# Patient Record
Sex: FEMALE | Race: BLACK OR AFRICAN AMERICAN | NOT HISPANIC OR LATINO | Employment: OTHER | URBAN - METROPOLITAN AREA
[De-identification: names, ages, dates, MRNs, and addresses within clinical notes are randomized per-mention and may not be internally consistent; named-entity substitution may affect disease eponyms.]

---

## 2023-02-08 PROBLEM — L21.9 SEBORRHEIC DERMATITIS: Status: ACTIVE | Noted: 2023-02-08

## 2023-02-08 PROBLEM — J32.9 CHRONIC SINUSITIS: Status: ACTIVE | Noted: 2023-02-08

## 2023-02-08 PROBLEM — R06.09 DOE (DYSPNEA ON EXERTION): Status: ACTIVE | Noted: 2023-02-08

## 2023-02-08 PROBLEM — R00.0 SINUS TACHYCARDIA: Status: ACTIVE | Noted: 2023-02-08

## 2023-02-08 PROBLEM — R60.0 PEDAL EDEMA: Status: ACTIVE | Noted: 2023-02-08

## 2023-02-08 PROBLEM — R73.03 PREDIABETES: Status: ACTIVE | Noted: 2023-02-08

## 2023-02-08 PROBLEM — F29 PSYCHOTIC DISORDER (MULTI): Status: ACTIVE | Noted: 2023-02-08

## 2023-02-08 PROBLEM — F20.9 SCHIZOPHRENIA (MULTI): Status: ACTIVE | Noted: 2023-02-08

## 2023-02-08 PROBLEM — D50.9 IRON DEFICIENCY ANEMIA: Status: ACTIVE | Noted: 2023-02-08

## 2023-02-08 PROBLEM — F25.9 SCHIZOAFFECTIVE DISORDER, IN REMISSION (MULTI): Status: ACTIVE | Noted: 2023-02-08

## 2023-02-08 PROBLEM — E55.9 VITAMIN D DEFICIENCY DISEASE: Status: ACTIVE | Noted: 2023-02-08

## 2023-02-08 PROBLEM — J01.80 OTHER ACUTE SINUSITIS: Status: ACTIVE | Noted: 2023-02-08

## 2023-02-08 PROBLEM — R06.02 SHORTNESS OF BREATH ON EXERTION: Status: ACTIVE | Noted: 2023-02-08

## 2023-02-08 PROBLEM — R77.9 ELEVATED SERUM PROTEIN LEVEL: Status: ACTIVE | Noted: 2023-02-08

## 2023-02-08 PROBLEM — R73.09 ELEVATED GLUCOSE LEVEL: Status: ACTIVE | Noted: 2023-02-08

## 2023-02-08 PROBLEM — M85.80 OSTEOPENIA: Status: ACTIVE | Noted: 2023-02-08

## 2023-02-08 PROBLEM — N95.1 HOT FLASHES, MENOPAUSAL: Status: ACTIVE | Noted: 2023-02-08

## 2023-02-08 PROBLEM — M19.90 OSTEOARTHRITIS (ARTHRITIS DUE TO WEAR AND TEAR OF JOINTS): Status: ACTIVE | Noted: 2023-02-08

## 2023-02-08 PROBLEM — I10 BENIGN ESSENTIAL HYPERTENSION: Status: ACTIVE | Noted: 2023-02-08

## 2023-02-08 PROBLEM — R93.1 ABNORMAL HEART SCORE CT: Status: ACTIVE | Noted: 2023-02-08

## 2023-02-08 PROBLEM — E78.00 ELEVATED LDL CHOLESTEROL LEVEL: Status: ACTIVE | Noted: 2023-02-08

## 2023-02-08 PROBLEM — L23.9 ALLERGIC DERMATITIS: Status: ACTIVE | Noted: 2023-02-08

## 2023-02-08 PROBLEM — D12.6 TUBULAR ADENOMA OF COLON: Status: ACTIVE | Noted: 2023-02-08

## 2023-02-08 PROBLEM — R73.9 HYPERGLYCEMIA: Status: ACTIVE | Noted: 2023-02-08

## 2023-02-08 PROBLEM — M25.569 KNEE PAIN: Status: ACTIVE | Noted: 2023-02-08

## 2023-02-08 PROBLEM — L98.9 SKIN LESION: Status: ACTIVE | Noted: 2023-02-08

## 2023-02-08 PROBLEM — L50.8 URTICARIA, ACUTE: Status: ACTIVE | Noted: 2023-02-08

## 2023-02-08 PROBLEM — R07.89 ATYPICAL CHEST PAIN: Status: ACTIVE | Noted: 2023-02-08

## 2023-02-08 PROBLEM — R44.3 HALLUCINATIONS: Status: ACTIVE | Noted: 2023-02-08

## 2023-02-08 RX ORDER — MULTIVIT-MIN/FA/LYCOPEN/LUTEIN .4-300-25
TABLET ORAL
COMMUNITY

## 2023-02-08 RX ORDER — AMLODIPINE BESYLATE 10 MG/1
10 TABLET ORAL DAILY
COMMUNITY
Start: 2018-08-13 | End: 2023-03-14 | Stop reason: SDUPTHER

## 2023-02-08 RX ORDER — CETIRIZINE HYDROCHLORIDE 10 MG/1
10 TABLET ORAL DAILY
COMMUNITY
Start: 2020-02-13

## 2023-02-08 RX ORDER — CELECOXIB 200 MG/1
200 CAPSULE ORAL 2 TIMES DAILY
COMMUNITY
Start: 2020-10-02 | End: 2023-03-14 | Stop reason: SDUPTHER

## 2023-02-08 RX ORDER — KETOCONAZOLE 20 MG/G
CREAM TOPICAL DAILY
COMMUNITY

## 2023-02-08 RX ORDER — ASPIRIN 81 MG/1
81 TABLET ORAL DAILY
COMMUNITY
Start: 2022-04-25

## 2023-02-08 RX ORDER — ATORVASTATIN CALCIUM 20 MG/1
20 TABLET, FILM COATED ORAL DAILY
COMMUNITY
Start: 2022-04-25 | End: 2023-03-14 | Stop reason: SDUPTHER

## 2023-02-08 RX ORDER — VIT C/E/ZN/COPPR/LUTEIN/ZEAXAN 250MG-90MG
25 CAPSULE ORAL
COMMUNITY
Start: 2018-04-04

## 2023-02-08 RX ORDER — METOPROLOL SUCCINATE 25 MG/1
25 TABLET, EXTENDED RELEASE ORAL DAILY
COMMUNITY
Start: 2022-02-10 | End: 2023-03-14 | Stop reason: SDUPTHER

## 2023-02-08 RX ORDER — BETAMETHASONE DIPROPIONATE 0.5 MG/G
CREAM TOPICAL 2 TIMES DAILY
COMMUNITY
Start: 2020-02-13

## 2023-02-08 RX ORDER — GLUCOSAMINE/CHONDRO SU A 500-400 MG
1 TABLET ORAL
COMMUNITY

## 2023-03-14 ENCOUNTER — LAB (OUTPATIENT)
Dept: LAB | Facility: LAB | Age: 78
End: 2023-03-14
Payer: MEDICARE

## 2023-03-14 ENCOUNTER — OFFICE VISIT (OUTPATIENT)
Dept: PRIMARY CARE | Facility: CLINIC | Age: 78
End: 2023-03-14
Payer: MEDICARE

## 2023-03-14 VITALS
HEART RATE: 80 BPM | RESPIRATION RATE: 16 BRPM | WEIGHT: 164.2 LBS | BODY MASS INDEX: 32.07 KG/M2 | TEMPERATURE: 97.8 F | SYSTOLIC BLOOD PRESSURE: 130 MMHG | DIASTOLIC BLOOD PRESSURE: 70 MMHG

## 2023-03-14 DIAGNOSIS — R60.0 PEDAL EDEMA: ICD-10-CM

## 2023-03-14 DIAGNOSIS — F25.9 SCHIZOAFFECTIVE DISORDER, IN REMISSION (MULTI): ICD-10-CM

## 2023-03-14 DIAGNOSIS — Z12.31 SCREENING MAMMOGRAM, ENCOUNTER FOR: ICD-10-CM

## 2023-03-14 DIAGNOSIS — I10 BENIGN ESSENTIAL HYPERTENSION: Primary | ICD-10-CM

## 2023-03-14 DIAGNOSIS — J44.9 CHRONIC OBSTRUCTIVE PULMONARY DISEASE, UNSPECIFIED COPD TYPE (MULTI): ICD-10-CM

## 2023-03-14 DIAGNOSIS — D12.6 TUBULAR ADENOMA OF COLON: ICD-10-CM

## 2023-03-14 DIAGNOSIS — M19.91 PRIMARY OSTEOARTHRITIS, UNSPECIFIED SITE: ICD-10-CM

## 2023-03-14 DIAGNOSIS — E78.00 ELEVATED LDL CHOLESTEROL LEVEL: ICD-10-CM

## 2023-03-14 DIAGNOSIS — I10 BENIGN ESSENTIAL HYPERTENSION: ICD-10-CM

## 2023-03-14 DIAGNOSIS — R73.03 PREDIABETES: ICD-10-CM

## 2023-03-14 PROBLEM — F20.9 SCHIZOPHRENIA (MULTI): Status: RESOLVED | Noted: 2023-02-08 | Resolved: 2023-03-14

## 2023-03-14 PROBLEM — F29 PSYCHOTIC DISORDER (MULTI): Status: RESOLVED | Noted: 2023-02-08 | Resolved: 2023-03-14

## 2023-03-14 LAB
ALANINE AMINOTRANSFERASE (SGPT) (U/L) IN SER/PLAS: 26 U/L (ref 7–45)
ALBUMIN (G/DL) IN SER/PLAS: 4.4 G/DL (ref 3.4–5)
ALKALINE PHOSPHATASE (U/L) IN SER/PLAS: 42 U/L (ref 33–136)
ANION GAP IN SER/PLAS: 15 MMOL/L (ref 10–20)
ASPARTATE AMINOTRANSFERASE (SGOT) (U/L) IN SER/PLAS: 25 U/L (ref 9–39)
BILIRUBIN TOTAL (MG/DL) IN SER/PLAS: 0.7 MG/DL (ref 0–1.2)
CALCIUM (MG/DL) IN SER/PLAS: 9.2 MG/DL (ref 8.6–10.3)
CARBON DIOXIDE, TOTAL (MMOL/L) IN SER/PLAS: 27 MMOL/L (ref 21–32)
CHLORIDE (MMOL/L) IN SER/PLAS: 102 MMOL/L (ref 98–107)
CREATININE (MG/DL) IN SER/PLAS: 0.73 MG/DL (ref 0.5–1.05)
ERYTHROCYTE DISTRIBUTION WIDTH (RATIO) BY AUTOMATED COUNT: 17.8 % (ref 11.5–14.5)
ERYTHROCYTE MEAN CORPUSCULAR HEMOGLOBIN CONCENTRATION (G/DL) BY AUTOMATED: 29.7 G/DL (ref 32–36)
ERYTHROCYTE MEAN CORPUSCULAR VOLUME (FL) BY AUTOMATED COUNT: 76 FL (ref 80–100)
ERYTHROCYTES (10*6/UL) IN BLOOD BY AUTOMATED COUNT: 6.02 X10E12/L (ref 4–5.2)
GFR FEMALE: 84 ML/MIN/1.73M2
GLUCOSE (MG/DL) IN SER/PLAS: 96 MG/DL (ref 74–99)
HEMATOCRIT (%) IN BLOOD BY AUTOMATED COUNT: 45.8 % (ref 36–46)
HEMOGLOBIN (G/DL) IN BLOOD: 13.6 G/DL (ref 12–16)
LEUKOCYTES (10*3/UL) IN BLOOD BY AUTOMATED COUNT: 7.5 X10E9/L (ref 4.4–11.3)
PLATELETS (10*3/UL) IN BLOOD AUTOMATED COUNT: 217 X10E9/L (ref 150–450)
POC HDL CHOLESTEROL: 65 MG/DL (ref 0–50)
POC LDL CHOLESTEROL: 57 MG/DL (ref 0–100)
POC NON-HDL CHOLESTEROL: 75 MG/DL (ref 0–130)
POC TOTAL CHOLESTEROL/HDL RATIO: 2.1 (ref 0–4.5)
POC TOTAL CHOLESTEROL: 139 MG/DL (ref 0–199)
POC TRIGLYCERIDES: 90 MG/DL (ref 0–150)
POTASSIUM (MMOL/L) IN SER/PLAS: 4.2 MMOL/L (ref 3.5–5.3)
PROTEIN TOTAL: 6.7 G/DL (ref 6.4–8.2)
SODIUM (MMOL/L) IN SER/PLAS: 140 MMOL/L (ref 136–145)
THYROTROPIN (MIU/L) IN SER/PLAS BY DETECTION LIMIT <= 0.05 MIU/L: 0.94 MIU/L (ref 0.44–3.98)
UREA NITROGEN (MG/DL) IN SER/PLAS: 14 MG/DL (ref 6–23)

## 2023-03-14 PROCEDURE — 99214 OFFICE O/P EST MOD 30 MIN: CPT | Performed by: INTERNAL MEDICINE

## 2023-03-14 PROCEDURE — 3075F SYST BP GE 130 - 139MM HG: CPT | Performed by: INTERNAL MEDICINE

## 2023-03-14 PROCEDURE — 36415 COLL VENOUS BLD VENIPUNCTURE: CPT

## 2023-03-14 PROCEDURE — 80061 LIPID PANEL: CPT | Performed by: INTERNAL MEDICINE

## 2023-03-14 PROCEDURE — 3078F DIAST BP <80 MM HG: CPT | Performed by: INTERNAL MEDICINE

## 2023-03-14 PROCEDURE — 84443 ASSAY THYROID STIM HORMONE: CPT

## 2023-03-14 PROCEDURE — 85027 COMPLETE CBC AUTOMATED: CPT

## 2023-03-14 PROCEDURE — 80053 COMPREHEN METABOLIC PANEL: CPT

## 2023-03-14 PROCEDURE — 1160F RVW MEDS BY RX/DR IN RCRD: CPT | Performed by: INTERNAL MEDICINE

## 2023-03-14 PROCEDURE — 1159F MED LIST DOCD IN RCRD: CPT | Performed by: INTERNAL MEDICINE

## 2023-03-14 RX ORDER — AMLODIPINE BESYLATE 10 MG/1
10 TABLET ORAL DAILY
Qty: 30 TABLET | Refills: 3 | Status: SHIPPED | OUTPATIENT
Start: 2023-03-14 | End: 2023-08-29 | Stop reason: SDUPTHER

## 2023-03-14 RX ORDER — ALBUTEROL SULFATE 90 UG/1
2 AEROSOL, METERED RESPIRATORY (INHALATION) EVERY 4 HOURS PRN
Qty: 8 G | Refills: 5 | Status: SHIPPED | OUTPATIENT
Start: 2023-03-14 | End: 2023-05-12 | Stop reason: SDUPTHER

## 2023-03-14 RX ORDER — ATORVASTATIN CALCIUM 20 MG/1
20 TABLET, FILM COATED ORAL DAILY
Qty: 30 TABLET | Refills: 3 | Status: SHIPPED | OUTPATIENT
Start: 2023-03-14 | End: 2023-08-09 | Stop reason: SDUPTHER

## 2023-03-14 RX ORDER — CELECOXIB 200 MG/1
200 CAPSULE ORAL 2 TIMES DAILY
Qty: 60 CAPSULE | Refills: 3 | Status: SHIPPED | OUTPATIENT
Start: 2023-03-14 | End: 2023-05-13

## 2023-03-14 RX ORDER — METOPROLOL SUCCINATE 25 MG/1
25 TABLET, EXTENDED RELEASE ORAL DAILY
Qty: 30 TABLET | Refills: 3 | Status: SHIPPED | OUTPATIENT
Start: 2023-03-14 | End: 2023-09-18 | Stop reason: SDUPTHER

## 2023-03-14 ASSESSMENT — ENCOUNTER SYMPTOMS
STRIDOR: 0
SLEEP DISTURBANCE: 0
ACTIVITY CHANGE: 0
EYE DISCHARGE: 0
CHEST TIGHTNESS: 0
FATIGUE: 0
POLYPHAGIA: 0
FREQUENCY: 0
MUSCULOSKELETAL NEGATIVE: 1
APNEA: 0
ABDOMINAL PAIN: 0
PSYCHIATRIC NEGATIVE: 1
NERVOUS/ANXIOUS: 0
DIARRHEA: 0
SPEECH DIFFICULTY: 0
MYALGIAS: 0
COLOR CHANGE: 0
SEIZURES: 0
TREMORS: 0
RECTAL PAIN: 0
NECK STIFFNESS: 0
NAUSEA: 0
POLYDIPSIA: 0
ALLERGIC/IMMUNOLOGIC NEGATIVE: 1
DIZZINESS: 0
WHEEZING: 0
ABDOMINAL DISTENTION: 0
SHORTNESS OF BREATH: 1
HYPERACTIVE: 0
HEADACHES: 0
CONFUSION: 0
CONSTITUTIONAL NEGATIVE: 1
PHOTOPHOBIA: 0
EYES NEGATIVE: 1
APPETITE CHANGE: 0
NEUROLOGICAL NEGATIVE: 1
VOICE CHANGE: 0
GASTROINTESTINAL NEGATIVE: 1
BRUISES/BLEEDS EASILY: 0
AGITATION: 0
ENDOCRINE NEGATIVE: 1
FLANK PAIN: 0
CONSTIPATION: 0
DYSPHORIC MOOD: 0
RHINORRHEA: 0
SORE THROAT: 0
DYSURIA: 0
ANAL BLEEDING: 0
UNEXPECTED WEIGHT CHANGE: 0
PALPITATIONS: 0
SINUS PRESSURE: 0
NECK PAIN: 0
HEMATOLOGIC/LYMPHATIC NEGATIVE: 1
HEMATURIA: 0
COUGH: 0
LIGHT-HEADEDNESS: 0
TROUBLE SWALLOWING: 0
CARDIOVASCULAR NEGATIVE: 1
BACK PAIN: 0
WEAKNESS: 0
JOINT SWELLING: 0
DIFFICULTY URINATING: 0
NUMBNESS: 0
BLOOD IN STOOL: 0

## 2023-03-14 NOTE — PROGRESS NOTES
Nani Bello is a 78 y.o. female     Patient with a past medical history of HTN, HLD, COPD, Elevated CAC with a normal stress test (2022)Hyperglycemia/ Pre Diabetes, Schizoaffective disorder, DJD, Osteopenia (Oct 2023), Mammogram (Feb), Colonoscopy due in 2023     Feels fine  No chest pain/  SOB/ dizziness  BM OK  Energy level ok  Appetite OK    Albuterol is helping  Uses it once a day                No chief complaint on file.       Past Medical History:   Diagnosis Date    Personal history of other mental and behavioral disorders     History of psychiatric hospitalization        Review of Systems   Constitutional: Negative.  Negative for activity change, appetite change, fatigue and unexpected weight change.   HENT: Negative.  Negative for congestion, dental problem, ear discharge, ear pain, hearing loss, mouth sores, nosebleeds, postnasal drip, rhinorrhea, sinus pressure, sneezing, sore throat, tinnitus, trouble swallowing and voice change.    Eyes: Negative.  Negative for photophobia, discharge and visual disturbance.   Respiratory:  Positive for shortness of breath. Negative for apnea, cough, chest tightness, wheezing and stridor.    Cardiovascular: Negative.  Negative for chest pain, palpitations and leg swelling.   Gastrointestinal: Negative.  Negative for abdominal distention, abdominal pain, anal bleeding, blood in stool, constipation, diarrhea, nausea and rectal pain.   Endocrine: Negative.  Negative for cold intolerance, heat intolerance, polydipsia, polyphagia and polyuria.   Genitourinary: Negative.  Negative for decreased urine volume, difficulty urinating, dysuria, flank pain, frequency, hematuria and urgency.   Musculoskeletal: Negative.  Negative for back pain, gait problem, joint swelling, myalgias, neck pain and neck stiffness.   Skin: Negative.  Negative for color change and rash.   Allergic/Immunologic: Negative.  Negative for food allergies.   Neurological: Negative.  Negative for dizziness,  tremors, seizures, syncope, speech difficulty, weakness, light-headedness, numbness and headaches.   Hematological: Negative.  Does not bruise/bleed easily.   Psychiatric/Behavioral: Negative.  Negative for agitation, confusion, dysphoric mood and sleep disturbance. The patient is not nervous/anxious and is not hyperactive.             5/12/2022    11:25 AM 5/12/2022    11:45 AM 5/31/2022    10:49 AM 6/30/2022     9:41 AM 9/15/2022    12:08 PM 12/20/2022    11:53 AM 12/20/2022    12:05 PM   Vitals   Systolic  130 126 137 130  130   Diastolic  80 56 71 70  70   Heart Rate  80 91 90 78  70   Resp  16   16  16   Height (in) 1.524 m (5')  1.524 m (5') 1.524 m (5')  1.524 m (5')    Weight (lb) 161.13  161 155  161    BMI 31.47 kg/m2  31.44 kg/m2 30.27 kg/m2  31.44 kg/m2    BSA (m2) 1.76 m2  1.76 m2 1.73 m2  1.76 m2        Physical Exam  Vitals and nursing note reviewed.   Constitutional:       Appearance: Normal appearance. She is normal weight.   HENT:      Head: Normocephalic and atraumatic.      Right Ear: Tympanic membrane, ear canal and external ear normal.      Left Ear: Tympanic membrane, ear canal and external ear normal.      Nose: Nose normal. No congestion or rhinorrhea.      Mouth/Throat:      Mouth: Mucous membranes are dry.      Pharynx: Oropharynx is clear.   Eyes:      General: No scleral icterus.        Right eye: No discharge.         Left eye: No discharge.      Extraocular Movements: Extraocular movements intact.      Conjunctiva/sclera: Conjunctivae normal.      Pupils: Pupils are equal, round, and reactive to light.   Neck:      Vascular: No carotid bruit.   Cardiovascular:      Rate and Rhythm: Normal rate and regular rhythm.      Pulses: Normal pulses.      Heart sounds: Normal heart sounds. No murmur heard.     No friction rub. No gallop.   Pulmonary:      Effort: Pulmonary effort is normal. No respiratory distress.      Breath sounds: Normal breath sounds. No stridor. No wheezing, rhonchi or  rales.   Chest:      Chest wall: No tenderness.   Abdominal:      General: Abdomen is flat. Bowel sounds are normal. There is no distension.      Palpations: Abdomen is soft. There is no mass.      Tenderness: There is no abdominal tenderness. There is no right CVA tenderness, left CVA tenderness, guarding or rebound.      Hernia: No hernia is present.   Genitourinary:     Rectum: Normal. Guaiac result negative.   Musculoskeletal:         General: No swelling, tenderness, deformity or signs of injury.      Cervical back: Normal range of motion and neck supple. No rigidity or tenderness.      Right lower leg: No edema.      Left lower leg: No edema.   Lymphadenopathy:      Cervical: No cervical adenopathy.   Skin:     Coloration: Skin is not jaundiced or pale.      Findings: No bruising, erythema, lesion or rash.   Neurological:      General: No focal deficit present.      Mental Status: She is alert and oriented to person, place, and time. Mental status is at baseline.      Cranial Nerves: No cranial nerve deficit.      Sensory: No sensory deficit.      Motor: No weakness.      Coordination: Coordination normal.      Gait: Gait normal.      Deep Tendon Reflexes: Reflexes normal.   Psychiatric:         Mood and Affect: Mood normal.         Behavior: Behavior normal.         Thought Content: Thought content normal.         Judgment: Judgment normal.           No results found for requested labs within last 365 days.     Assessment/Plan          Patient with a past medical history of HTN, HLD, COPD, Elevated CAC with a normal stress test (2022)Hyperglycemia/ Pre Diabetes, Schizoaffective disorder, DJD, Osteopenia (Oct 2023), Mammogram (Feb), Colonoscopy due in 2023     # COPD  Continue Albuterol inhaler     # Prediabetes  stable     # HTN   better numbers     # HLD/ Elevated CAC  condition is stable  continue current medications     # Schizoaffective disorder  See Dr Ray, but not on any meds

## 2023-03-21 ENCOUNTER — APPOINTMENT (OUTPATIENT)
Dept: PRIMARY CARE | Facility: CLINIC | Age: 78
End: 2023-03-21
Payer: MEDICARE

## 2023-04-25 DIAGNOSIS — R92.8 ABNORMAL MAMMOGRAM: Primary | ICD-10-CM

## 2023-05-12 DIAGNOSIS — J44.9 CHRONIC OBSTRUCTIVE PULMONARY DISEASE, UNSPECIFIED COPD TYPE (MULTI): Primary | ICD-10-CM

## 2023-05-12 RX ORDER — FLUTICASONE PROPIONATE AND SALMETEROL 250; 50 UG/1; UG/1
1 POWDER RESPIRATORY (INHALATION)
Qty: 60 EACH | Refills: 11 | Status: SHIPPED | OUTPATIENT
Start: 2023-05-12 | End: 2024-04-05 | Stop reason: SDUPTHER

## 2023-05-12 RX ORDER — ALBUTEROL SULFATE 90 UG/1
2 AEROSOL, METERED RESPIRATORY (INHALATION) EVERY 4 HOURS PRN
Qty: 8 G | Refills: 5 | Status: SHIPPED | OUTPATIENT
Start: 2023-05-12 | End: 2024-05-11

## 2023-06-22 ENCOUNTER — OFFICE VISIT (OUTPATIENT)
Dept: PRIMARY CARE | Facility: CLINIC | Age: 78
End: 2023-06-22
Payer: MEDICARE

## 2023-06-22 VITALS
DIASTOLIC BLOOD PRESSURE: 77 MMHG | SYSTOLIC BLOOD PRESSURE: 145 MMHG | BODY MASS INDEX: 33.12 KG/M2 | HEART RATE: 78 BPM | TEMPERATURE: 98.4 F | WEIGHT: 169.6 LBS

## 2023-06-22 DIAGNOSIS — I10 BENIGN ESSENTIAL HYPERTENSION: ICD-10-CM

## 2023-06-22 DIAGNOSIS — E13.69 OTHER SPECIFIED DIABETES MELLITUS WITH OTHER SPECIFIED COMPLICATION, UNSPECIFIED WHETHER LONG TERM INSULIN USE (MULTI): ICD-10-CM

## 2023-06-22 DIAGNOSIS — R73.03 PREDIABETES: ICD-10-CM

## 2023-06-22 DIAGNOSIS — J43.9 PULMONARY EMPHYSEMA, UNSPECIFIED EMPHYSEMA TYPE (MULTI): Primary | ICD-10-CM

## 2023-06-22 DIAGNOSIS — E78.00 ELEVATED LDL CHOLESTEROL LEVEL: ICD-10-CM

## 2023-06-22 LAB
POC FINGERSTICK BLOOD GLUCOSE: 120 MG/DL (ref 70–100)
POC HDL CHOLESTEROL: 58 MG/DL (ref 0–50)
POC HEMOGLOBIN A1C: 6.1 % (ref 4.2–6.5)
POC LDL CHOLESTEROL: 65 MG/DL (ref 0–100)
POC NON-HDL CHOLESTEROL: 75 MG/DL (ref 0–130)
POC TOTAL CHOLESTEROL/HDL RATIO: 2.3 (ref 0–4.5)
POC TOTAL CHOLESTEROL: 134 MG/DL (ref 0–199)
POC TRIGLYCERIDES: 54 MG/DL (ref 0–150)

## 2023-06-22 PROCEDURE — 3078F DIAST BP <80 MM HG: CPT | Performed by: INTERNAL MEDICINE

## 2023-06-22 PROCEDURE — 1159F MED LIST DOCD IN RCRD: CPT | Performed by: INTERNAL MEDICINE

## 2023-06-22 PROCEDURE — 82962 GLUCOSE BLOOD TEST: CPT | Performed by: INTERNAL MEDICINE

## 2023-06-22 PROCEDURE — 99214 OFFICE O/P EST MOD 30 MIN: CPT | Performed by: INTERNAL MEDICINE

## 2023-06-22 PROCEDURE — 1160F RVW MEDS BY RX/DR IN RCRD: CPT | Performed by: INTERNAL MEDICINE

## 2023-06-22 PROCEDURE — 80061 LIPID PANEL: CPT | Performed by: INTERNAL MEDICINE

## 2023-06-22 PROCEDURE — 3077F SYST BP >= 140 MM HG: CPT | Performed by: INTERNAL MEDICINE

## 2023-06-22 PROCEDURE — 1036F TOBACCO NON-USER: CPT | Performed by: INTERNAL MEDICINE

## 2023-06-22 PROCEDURE — 83036 HEMOGLOBIN GLYCOSYLATED A1C: CPT | Performed by: INTERNAL MEDICINE

## 2023-06-22 RX ORDER — CELECOXIB 200 MG/1
200 CAPSULE ORAL
COMMUNITY
Start: 2023-06-13 | End: 2023-12-04 | Stop reason: SDUPTHER

## 2023-06-22 ASSESSMENT — ENCOUNTER SYMPTOMS
OCCASIONAL FEELINGS OF UNSTEADINESS: 0
DEPRESSION: 0
LOSS OF SENSATION IN FEET: 0

## 2023-06-22 NOTE — PROGRESS NOTES
Nani Bello is a 78 y.o. female   Patient with a past medical history of HTN, HLD, COPD, Elevated CAC with a normal stress test (2022)Hyperglycemia/ Pre Diabetes, Schizoaffective disorder, DJD, Osteopenia (Oct 2023), Mammogram (Feb), Colonoscopy due in 2023    Breathing is better  Wixela is working'  Using rescue inhalrers sparingly    Feels fine  No chest pain/  SOB/ dizziness  BM OK  Energy level ok  Appetite OK    Avg bp at home as 115/60         Review of Systems     Constitutional: no fever, no chills, not feeling poorly, not feeling tired and no recent weight gain, no recent weight loss.   ENT: no earache, no hearing loss, no nosebleeds, no nasal discharge, no sore throat and no hoarseness.   Cardiovascular: the heart rate was not slow, the heart rate was not fast, no chest pain, no palpitations, no intermittent leg claudication and no lower extremity edema.   Respiratory: no cough, wheezing or shortness of breath at rest or exertion  Gastrointestinal: no abdominal pain, no constipation, no melena, no nausea, no diarrhea, no vomiting and no blood in stools.   Musculoskeletal: no arthralgias, no myalgias, no back pain, no joint swelling, no joint stiffness, no limb pain and no limb swelling.   Integumentary: no skin rashes, no skin lesions, no itching, no skin wound and no dry skin.   Neurological: no headache, no confusion, no numbness, no dizziness, no tingling and no fainting.   All other systems have been reviewed and are negative for complaint.       Vitals:    06/22/23 1040   BP: 145/77   Pulse: 78   Temp: 36.9 °C (98.4 °F)        Physical Exam     Constitutional   General appearance: Alert and in no acute distress.     Pulmonary   Respiratory assessment: No respiratory distress, normal respiratory rhythm and effort.    Auscultation of Lungs: Clear bilateral breath sounds.   Cardiovascular   Auscultation of heart: Apical pulse normal, heart rate and rhythm normal, normal S1 and S2, no murmurs and no  pericardial rub.    Exam for edema: No peripheral edema.   Abdomen   Abdominal Exam: No bruits, normal bowel sounds, soft, non-tender, no abdominal mass palpated.    Liver and Spleen exam: No hepato-splenomegaly.   Musculoskeletal   Examination of gait: Normal.    Inspection of digits and nails: No clubbing or cyanosis of the fingernails.    Inspection/palpation of joints, bones and muscles: No joint swelling. Normal movement of all extremities.   Skin   Skin inspection: Normal skin color and pigmentation, normal skin turgor and no visible rash.   Neurologic   Cranial nerves: Nerves 2-12 were intact, no focal neuro defects.     Assessment/Plan          Patient with a past medical history of HTN, HLD, COPD, Elevated CAC with a normal stress test (2022)Hyperglycemia/ Pre Diabetes, Schizoaffective disorder, DJD, Osteopenia (Oct 2023), Mammogram (Feb), Colonoscopy due in 2023    # COPD  confirmed on PFT  start Albuterol inhaler    # Prediabetes  stable    # HTN   better numbers at home  Bring monitor    # HLD/ Elevated CAC  condition is stable  continue current medications

## 2023-08-09 DIAGNOSIS — E78.00 ELEVATED LDL CHOLESTEROL LEVEL: ICD-10-CM

## 2023-08-09 RX ORDER — ATORVASTATIN CALCIUM 20 MG/1
20 TABLET, FILM COATED ORAL DAILY
Qty: 90 TABLET | Refills: 0 | Status: SHIPPED | OUTPATIENT
Start: 2023-08-09 | End: 2023-11-09 | Stop reason: SDUPTHER

## 2023-08-29 DIAGNOSIS — I10 BENIGN ESSENTIAL HYPERTENSION: ICD-10-CM

## 2023-08-29 RX ORDER — AMLODIPINE BESYLATE 10 MG/1
10 TABLET ORAL DAILY
Qty: 30 TABLET | Refills: 3 | Status: SHIPPED | OUTPATIENT
Start: 2023-08-29 | End: 2024-02-07 | Stop reason: SDUPTHER

## 2023-09-18 DIAGNOSIS — I10 BENIGN ESSENTIAL HYPERTENSION: ICD-10-CM

## 2023-09-18 RX ORDER — METOPROLOL SUCCINATE 25 MG/1
25 TABLET, EXTENDED RELEASE ORAL DAILY
Qty: 30 TABLET | Refills: 3 | Status: SHIPPED | OUTPATIENT
Start: 2023-09-18 | End: 2024-01-19 | Stop reason: SDUPTHER

## 2023-09-20 ENCOUNTER — OFFICE VISIT (OUTPATIENT)
Dept: PRIMARY CARE | Facility: CLINIC | Age: 78
End: 2023-09-20
Payer: MEDICARE

## 2023-09-20 VITALS
SYSTOLIC BLOOD PRESSURE: 130 MMHG | TEMPERATURE: 98.4 F | BODY MASS INDEX: 33.79 KG/M2 | RESPIRATION RATE: 16 BRPM | DIASTOLIC BLOOD PRESSURE: 70 MMHG | WEIGHT: 173 LBS | HEART RATE: 66 BPM

## 2023-09-20 DIAGNOSIS — E78.00 ELEVATED LDL CHOLESTEROL LEVEL: ICD-10-CM

## 2023-09-20 DIAGNOSIS — I10 BENIGN ESSENTIAL HYPERTENSION: Primary | ICD-10-CM

## 2023-09-20 DIAGNOSIS — J43.9 PULMONARY EMPHYSEMA, UNSPECIFIED EMPHYSEMA TYPE (MULTI): ICD-10-CM

## 2023-09-20 DIAGNOSIS — Z91.89 PNEUMOCOCCAL VACCINATION INDICATED: ICD-10-CM

## 2023-09-20 LAB
POC HDL CHOLESTEROL: 60 MG/DL (ref 0–50)
POC LDL CHOLESTEROL: 70 MG/DL (ref 0–100)
POC NON-HDL CHOLESTEROL: 84 MG/DL (ref 0–130)
POC TOTAL CHOLESTEROL/HDL RATIO: 2.4 (ref 0–4.5)
POC TOTAL CHOLESTEROL: 144 MG/DL (ref 0–199)
POC TRIGLYCERIDES: 67 MG/DL (ref 0–150)

## 2023-09-20 PROCEDURE — 1036F TOBACCO NON-USER: CPT | Performed by: INTERNAL MEDICINE

## 2023-09-20 PROCEDURE — 3075F SYST BP GE 130 - 139MM HG: CPT | Performed by: INTERNAL MEDICINE

## 2023-09-20 PROCEDURE — 80061 LIPID PANEL: CPT | Performed by: INTERNAL MEDICINE

## 2023-09-20 PROCEDURE — 1160F RVW MEDS BY RX/DR IN RCRD: CPT | Performed by: INTERNAL MEDICINE

## 2023-09-20 PROCEDURE — 99214 OFFICE O/P EST MOD 30 MIN: CPT | Performed by: INTERNAL MEDICINE

## 2023-09-20 PROCEDURE — 3078F DIAST BP <80 MM HG: CPT | Performed by: INTERNAL MEDICINE

## 2023-09-20 PROCEDURE — 1159F MED LIST DOCD IN RCRD: CPT | Performed by: INTERNAL MEDICINE

## 2023-09-20 NOTE — PROGRESS NOTES
Nani Bello is a 78 y.o. female   Patient with a past medical history of HTN, HLD, COPD, Elevated CAC with a normal stress test (2022)Hyperglycemia/ Pre Diabetes, Schizoaffective disorder, DJD, Osteopenia (Oct 2023), Mammogram (Feb), Colonoscopy up to date      Feels fine  No chest pain/  SOB/ dizziness  BM OK  Energy level ok  Appetite OK  Breathing is stable, but has had a few flares      Avg bp at home as 115/60         Review of Systems     Constitutional: no fever, no chills, not feeling poorly, not feeling tired and no recent weight gain, no recent weight loss.   ENT: no earache, no hearing loss, no nosebleeds, no nasal discharge, no sore throat and no hoarseness.   Cardiovascular: the heart rate was not slow, the heart rate was not fast, no chest pain, no palpitations, no intermittent leg claudication and no lower extremity edema.   Respiratory: no cough, wheezing or shortness of breath at rest or exertion  Gastrointestinal: no abdominal pain, no constipation, no melena, no nausea, no diarrhea, no vomiting and no blood in stools.   Musculoskeletal: no arthralgias, no myalgias, no back pain, no joint swelling, no joint stiffness, no limb pain and no limb swelling.   Integumentary: no skin rashes, no skin lesions, no itching, no skin wound and no dry skin.   Neurological: no headache, no confusion, no numbness, no dizziness, no tingling and no fainting.   All other systems have been reviewed and are negative for complaint.       Vitals:    09/20/23 1451   Temp: 36.9 °C (98.4 °F)        Physical Exam     Constitutional   General appearance: Alert and in no acute distress.     Pulmonary   Respiratory assessment: No respiratory distress, normal respiratory rhythm and effort.    Auscultation of Lungs: Clear bilateral breath sounds.   Cardiovascular   Auscultation of heart: Apical pulse normal, heart rate and rhythm normal, normal S1 and S2, no murmurs and no pericardial rub.    Exam for edema: No peripheral  edema.   Abdomen   Abdominal Exam: No bruits, normal bowel sounds, soft, non-tender, no abdominal mass palpated.    Liver and Spleen exam: No hepato-splenomegaly.   Musculoskeletal   Examination of gait: Normal.    Inspection of digits and nails: No clubbing or cyanosis of the fingernails.    Inspection/palpation of joints, bones and muscles: No joint swelling. Normal movement of all extremities.   Skin   Skin inspection: Normal skin color and pigmentation, normal skin turgor and no visible rash.   Neurologic   Cranial nerves: Nerves 2-12 were intact, no focal neuro defects.     Assessment/Plan          Patient with a past medical history of HTN, HLD, COPD, Elevated CAC with a normal stress test (2022)Hyperglycemia/ Pre Diabetes, Schizoaffective disorder, DJD, Osteopenia (Oct 2023), Mammogram (Feb), Colonoscopy complete    # COPD  Stable  Continue Wixela    # Prediabetes  stable    # HTN   Stable  Continue current medications      # HLD/ Elevated CAC  Stable  Continue current medications  Watch salt, avoid excessive caffeine  Avoid processed meats/ sugars/juices Instead eat fresh fruit  Add walnuts and almonds to your diet  exercise 6 days a week for 30 minutes    Rx for PNEUMO VAC

## 2023-09-21 ENCOUNTER — APPOINTMENT (OUTPATIENT)
Dept: PRIMARY CARE | Facility: CLINIC | Age: 78
End: 2023-09-21
Payer: MEDICARE

## 2023-11-09 DIAGNOSIS — E78.00 ELEVATED LDL CHOLESTEROL LEVEL: ICD-10-CM

## 2023-11-09 RX ORDER — ATORVASTATIN CALCIUM 20 MG/1
20 TABLET, FILM COATED ORAL DAILY
Qty: 90 TABLET | Refills: 0 | Status: SHIPPED | OUTPATIENT
Start: 2023-11-09 | End: 2024-02-07 | Stop reason: SDUPTHER

## 2023-11-09 NOTE — TELEPHONE ENCOUNTER
Rx Refill Request Telephone Encounter    Name:  Nani Bello  :  [id number]  Specific Pharmacy location:  [facility]

## 2023-12-04 DIAGNOSIS — M19.91 PRIMARY OSTEOARTHRITIS, UNSPECIFIED SITE: ICD-10-CM

## 2023-12-04 RX ORDER — CELECOXIB 200 MG/1
200 CAPSULE ORAL DAILY
Qty: 90 CAPSULE | Refills: 0 | Status: SHIPPED | OUTPATIENT
Start: 2023-12-04 | End: 2024-02-28 | Stop reason: SDUPTHER

## 2023-12-27 ENCOUNTER — OFFICE VISIT (OUTPATIENT)
Dept: PRIMARY CARE | Facility: CLINIC | Age: 78
End: 2023-12-27
Payer: MEDICARE

## 2023-12-27 VITALS — BODY MASS INDEX: 33.75 KG/M2 | TEMPERATURE: 98.1 F | WEIGHT: 172.8 LBS

## 2023-12-27 DIAGNOSIS — R73.03 PREDIABETES: ICD-10-CM

## 2023-12-27 DIAGNOSIS — I10 BENIGN ESSENTIAL HYPERTENSION: Primary | ICD-10-CM

## 2023-12-27 DIAGNOSIS — Z71.89 COUNSELING REGARDING END OF LIFE DECISION MAKING: ICD-10-CM

## 2023-12-27 DIAGNOSIS — M85.88 OSTEOPENIA OF LUMBAR SPINE: ICD-10-CM

## 2023-12-27 DIAGNOSIS — Z12.31 SCREENING MAMMOGRAM, ENCOUNTER FOR: ICD-10-CM

## 2023-12-27 DIAGNOSIS — J43.9 PULMONARY EMPHYSEMA, UNSPECIFIED EMPHYSEMA TYPE (MULTI): ICD-10-CM

## 2023-12-27 DIAGNOSIS — E78.00 ELEVATED LDL CHOLESTEROL LEVEL: ICD-10-CM

## 2023-12-27 DIAGNOSIS — Z00.00 ROUTINE GENERAL MEDICAL EXAMINATION AT HEALTH CARE FACILITY: ICD-10-CM

## 2023-12-27 LAB
POC HDL CHOLESTEROL: 60 MG/DL (ref 0–50)
POC LDL CHOLESTEROL: 59 MG/DL (ref 0–100)
POC NON-HDL CHOLESTEROL: 75 MG/DL (ref 0–130)
POC TOTAL CHOLESTEROL/HDL RATIO: 2.3 (ref 0–4.5)
POC TOTAL CHOLESTEROL: 135 MG/DL (ref 0–199)
POC TRIGLYCERIDES: 82 MG/DL (ref 0–150)

## 2023-12-27 PROCEDURE — 99214 OFFICE O/P EST MOD 30 MIN: CPT | Performed by: INTERNAL MEDICINE

## 2023-12-27 PROCEDURE — 1036F TOBACCO NON-USER: CPT | Performed by: INTERNAL MEDICINE

## 2023-12-27 PROCEDURE — 1160F RVW MEDS BY RX/DR IN RCRD: CPT | Performed by: INTERNAL MEDICINE

## 2023-12-27 PROCEDURE — 80061 LIPID PANEL: CPT | Performed by: INTERNAL MEDICINE

## 2023-12-27 PROCEDURE — G0439 PPPS, SUBSEQ VISIT: HCPCS | Performed by: INTERNAL MEDICINE

## 2023-12-27 PROCEDURE — 1159F MED LIST DOCD IN RCRD: CPT | Performed by: INTERNAL MEDICINE

## 2023-12-27 ASSESSMENT — PATIENT HEALTH QUESTIONNAIRE - PHQ9
2. FEELING DOWN, DEPRESSED OR HOPELESS: NOT AT ALL
1. LITTLE INTEREST OR PLEASURE IN DOING THINGS: NOT AT ALL
SUM OF ALL RESPONSES TO PHQ9 QUESTIONS 1 AND 2: 0

## 2023-12-27 ASSESSMENT — ENCOUNTER SYMPTOMS
OCCASIONAL FEELINGS OF UNSTEADINESS: 0
DEPRESSION: 0
LOSS OF SENSATION IN FEET: 0

## 2023-12-27 NOTE — PROGRESS NOTES
Nani Bello is a 78 y.o. female here for a Medicare Wellness Exam.    No chief complaint on file.       Patient with a past medical history of HTN, HLD, COPD, Elevated CAC with a normal stress test (2022)Hyperglycemia/ Pre Diabetes, Schizoaffective disorder, DJD, Osteopenia (Oct 2023), Mammogram (Feb), Colonoscopy up to date    Feels fine apart from night sweats   Gets occasional cramp in right foot    No chest pain/  SOB/ dizziness  BM OK  Energy level ok   Appetite OK           Medicare Wellness Exam    The patent is being seen for a follow up annual wellness visit  Past Medical, Surgical and family History: Reviewed and updated in chart  Interval History: Patient has not been hospitalized previously  Medications and Supplements: Review of all medications by a prescribing practitioner or clinical pharmacist (such as prescriptions, OTC, Herbal therapies and supplements) documented in the medical record.    Patient Self-Assessment of health: Fair  Tobacco Use: No  Alcohol Use: No  Illicit drug use: No  Patient using opioids: No    Current Diet: well balanced  Adequate fluid intake: Yes  Caffeine intake: Yes  Exercise frequency: moderately active    Depression/Suicide screening: PHQ2/ PHQ9 (see screenings tab)    Hearing impairment: No  Uses hearing aids N/A  Cognitive impairment Observation: No   Patient or family reported cognitive impairment: No    Bathing: independent  Dressing: independent  Walking: independent  Taking Medications: independent  Feeding: independent  Personal Hygiene: independent  Managing Finances: independent  Shopping: independent  Housework/Basic Home Maintenance: independent  Handling transportation: independent  Preparing meals: independent    Bowels: continent  Bladder: continent    Falls Risk: has notfallen in last 6 months.   Their fall has not resulted in an injury.   Fall risk Factors: Fall Risk Factors:    none   Care Plan Risk: Care Plan: Low/Moderate Risk: Regular physical  activity such as walking, water aerobics or casper chi to improve strength, balance, coordination and flexibility. Wear appropriate, sensible shoe wear. Remove fall hazards at home such as loose rugs, obstacles, use non-slip surface in bath or shower. Keep living space well lit.      Home Safety Risk Factors: Home Safety Risk Factors: None  Advanced Directives:  Living will: Yes POA: Yes    Patient's End of Life Decisions: Provider agree to follow.      Past Medical History:   Diagnosis Date    Personal history of other mental and behavioral disorders     History of psychiatric hospitalization        Review of Systems     Constitutional: no fever, no chills, not feeling poorly, not feeling tired and no recent weight gain, no recent weight loss.   ENT: no earache, no hearing loss, no nosebleeds, no nasal discharge, no sore throat and no hoarseness.   Cardiovascular: the heart rate was not slow, the heart rate was not fast, no chest pain, no palpitations, no intermittent leg claudication and no lower extremity edema.   Respiratory: no cough, wheezing or shortness of breath at rest or exertion  Gastrointestinal: no abdominal pain, no constipation, no melena, no nausea, no diarrhea, no vomiting and no blood in stools.   Musculoskeletal: no arthralgias, no myalgias, no back pain, no joint swelling, no joint stiffness, no limb pain and no limb swelling.   Integumentary: no skin rashes, no skin lesions, no itching, no skin wound and no dry skin.   Neurological: no headache, no confusion, no numbness, no dizziness, no tingling and no fainting.   All other systems have been reviewed and are negative for complaint.          Physical Exam     Constitutional   General appearance: Alert and in no acute distress.     Pulmonary   Respiratory assessment: No respiratory distress, normal respiratory rhythm and effort.    Auscultation of Lungs: Clear bilateral breath sounds.   Cardiovascular   Auscultation of heart: Apical pulse normal,  heart rate and rhythm normal, normal S1 and S2, no murmurs and no pericardial rub.    Exam for edema: No peripheral edema.   Abdomen   Abdominal Exam: No bruits, normal bowel sounds, soft, non-tender, no abdominal mass palpated.    Liver and Spleen exam: No hepato-splenomegaly.   Musculoskeletal   Examination of gait: Normal.    Inspection of digits and nails: No clubbing or cyanosis of the fingernails.    Inspection/palpation of joints, bones and muscles: No joint swelling. Normal movement of all extremities.   Skin   Skin inspection: Normal skin color and pigmentation, normal skin turgor and no visible rash.   Neurologic   Cranial nerves: Nerves 2-12 were intact, no focal neuro defects.          Assessment/Plan          Patient with a past medical history of HTN, HLD, COPD, Elevated CAC with a normal stress test (2022)Hyperglycemia/ Pre Diabetes, Schizoaffective disorder, DJD, Osteopenia (Oct 2023), Mammogram (Feb), Colonoscopy up to date     #Hypertension  Stable continue home medications    #Dyslipidemia  Stable  Continue current medications  Watch salt, avoid excessive caffeine  Avoid processed meats/ sugars/juices Instead eat fresh fruit  Add walnuts and almonds to your diet  exercise 6 days a week for 30 minutes    #COPD  Does get dyspnea on exertion but overall stable    #Prediabetes  Continue exercise and healthy eating habits    #Osteopenia  Check bone density    #Occasional right foot cramps  Patient has flatfeet  Recommend stretching exercises    Counseled the patient getting pneumonia vaccine  Complete blood work  Mammogram  DEXA scan ordered

## 2023-12-28 ENCOUNTER — APPOINTMENT (OUTPATIENT)
Dept: PRIMARY CARE | Facility: CLINIC | Age: 78
End: 2023-12-28
Payer: MEDICARE

## 2024-01-19 DIAGNOSIS — I10 BENIGN ESSENTIAL HYPERTENSION: ICD-10-CM

## 2024-01-19 RX ORDER — METOPROLOL SUCCINATE 25 MG/1
25 TABLET, EXTENDED RELEASE ORAL DAILY
Qty: 30 TABLET | Refills: 3 | Status: SHIPPED | OUTPATIENT
Start: 2024-01-19 | End: 2024-05-24 | Stop reason: SDUPTHER

## 2024-02-07 DIAGNOSIS — E78.00 ELEVATED LDL CHOLESTEROL LEVEL: ICD-10-CM

## 2024-02-07 DIAGNOSIS — I10 BENIGN ESSENTIAL HYPERTENSION: ICD-10-CM

## 2024-02-07 RX ORDER — AMLODIPINE BESYLATE 10 MG/1
10 TABLET ORAL DAILY
Qty: 90 TABLET | Refills: 0 | Status: SHIPPED | OUTPATIENT
Start: 2024-02-07 | End: 2024-05-15 | Stop reason: SDUPTHER

## 2024-02-07 RX ORDER — ATORVASTATIN CALCIUM 20 MG/1
20 TABLET, FILM COATED ORAL DAILY
Qty: 90 TABLET | Refills: 0 | Status: SHIPPED | OUTPATIENT
Start: 2024-02-07 | End: 2024-04-07

## 2024-02-28 DIAGNOSIS — M19.91 PRIMARY OSTEOARTHRITIS, UNSPECIFIED SITE: ICD-10-CM

## 2024-02-28 RX ORDER — CELECOXIB 200 MG/1
200 CAPSULE ORAL DAILY
Qty: 90 CAPSULE | Refills: 1 | Status: SHIPPED | OUTPATIENT
Start: 2024-02-28

## 2024-03-13 ENCOUNTER — LAB (OUTPATIENT)
Dept: LAB | Facility: LAB | Age: 79
End: 2024-03-13
Payer: MEDICARE

## 2024-03-13 DIAGNOSIS — E78.00 ELEVATED LDL CHOLESTEROL LEVEL: ICD-10-CM

## 2024-03-13 DIAGNOSIS — I10 BENIGN ESSENTIAL HYPERTENSION: ICD-10-CM

## 2024-03-13 DIAGNOSIS — R73.03 PREDIABETES: ICD-10-CM

## 2024-03-13 LAB
ALBUMIN SERPL BCP-MCNC: 4.4 G/DL (ref 3.4–5)
ALP SERPL-CCNC: 58 U/L (ref 33–136)
ALT SERPL W P-5'-P-CCNC: 31 U/L (ref 7–45)
ANION GAP SERPL CALC-SCNC: 11 MMOL/L (ref 10–20)
AST SERPL W P-5'-P-CCNC: 30 U/L (ref 9–39)
BILIRUB SERPL-MCNC: 0.7 MG/DL (ref 0–1.2)
BUN SERPL-MCNC: 12 MG/DL (ref 6–23)
CALCIUM SERPL-MCNC: 9.5 MG/DL (ref 8.6–10.3)
CHLORIDE SERPL-SCNC: 101 MMOL/L (ref 98–107)
CO2 SERPL-SCNC: 31 MMOL/L (ref 21–32)
CREAT SERPL-MCNC: 0.83 MG/DL (ref 0.5–1.05)
EGFRCR SERPLBLD CKD-EPI 2021: 72 ML/MIN/1.73M*2
ERYTHROCYTE [DISTWIDTH] IN BLOOD BY AUTOMATED COUNT: 17.4 % (ref 11.5–14.5)
EST. AVERAGE GLUCOSE BLD GHB EST-MCNC: 134 MG/DL
GLUCOSE SERPL-MCNC: 94 MG/DL (ref 74–99)
HBA1C MFR BLD: 6.3 %
HCT VFR BLD AUTO: 44.4 % (ref 36–46)
HGB BLD-MCNC: 13.5 G/DL (ref 12–16)
MCH RBC QN AUTO: 22.4 PG (ref 26–34)
MCHC RBC AUTO-ENTMCNC: 30.4 G/DL (ref 32–36)
MCV RBC AUTO: 74 FL (ref 80–100)
NRBC BLD-RTO: 0 /100 WBCS (ref 0–0)
PLATELET # BLD AUTO: 234 X10*3/UL (ref 150–450)
POTASSIUM SERPL-SCNC: 4.3 MMOL/L (ref 3.5–5.3)
PROT SERPL-MCNC: 7.4 G/DL (ref 6.4–8.2)
RBC # BLD AUTO: 6.03 X10*6/UL (ref 4–5.2)
SODIUM SERPL-SCNC: 139 MMOL/L (ref 136–145)
TSH SERPL-ACNC: 0.81 MIU/L (ref 0.44–3.98)
WBC # BLD AUTO: 7.6 X10*3/UL (ref 4.4–11.3)

## 2024-03-13 PROCEDURE — 80053 COMPREHEN METABOLIC PANEL: CPT

## 2024-03-13 PROCEDURE — 36415 COLL VENOUS BLD VENIPUNCTURE: CPT

## 2024-03-13 PROCEDURE — 84443 ASSAY THYROID STIM HORMONE: CPT

## 2024-03-13 PROCEDURE — 83036 HEMOGLOBIN GLYCOSYLATED A1C: CPT

## 2024-03-13 PROCEDURE — 85027 COMPLETE CBC AUTOMATED: CPT

## 2024-03-27 ENCOUNTER — HOSPITAL ENCOUNTER (OUTPATIENT)
Dept: RADIOLOGY | Facility: CLINIC | Age: 79
End: 2024-03-27
Payer: MEDICARE

## 2024-04-05 DIAGNOSIS — J44.9 CHRONIC OBSTRUCTIVE PULMONARY DISEASE, UNSPECIFIED COPD TYPE (MULTI): ICD-10-CM

## 2024-04-05 RX ORDER — FLUTICASONE PROPIONATE AND SALMETEROL 250; 50 UG/1; UG/1
1 POWDER RESPIRATORY (INHALATION)
Qty: 60 EACH | Refills: 11 | Status: SHIPPED | OUTPATIENT
Start: 2024-04-05 | End: 2025-04-05

## 2024-04-05 NOTE — TELEPHONE ENCOUNTER
Rx Refill Request Telephone Encounter    Name:  Nani Bello  :  [id number]  Specific Pharmacy location:  [facility]

## 2024-04-09 ENCOUNTER — LAB (OUTPATIENT)
Dept: LAB | Facility: LAB | Age: 79
End: 2024-04-09
Payer: MEDICARE

## 2024-04-09 DIAGNOSIS — I10 BENIGN ESSENTIAL HYPERTENSION: ICD-10-CM

## 2024-04-09 DIAGNOSIS — E78.00 ELEVATED LDL CHOLESTEROL LEVEL: ICD-10-CM

## 2024-04-09 DIAGNOSIS — R73.03 PREDIABETES: ICD-10-CM

## 2024-04-09 LAB
CHOLEST SERPL-MCNC: 125 MG/DL (ref 0–199)
CHOLESTEROL/HDL RATIO: 2.1
HDLC SERPL-MCNC: 58.9 MG/DL
LDLC SERPL CALC-MCNC: 53 MG/DL
NON HDL CHOLESTEROL: 66 MG/DL (ref 0–149)
TRIGL SERPL-MCNC: 66 MG/DL (ref 0–149)
VLDL: 13 MG/DL (ref 0–40)

## 2024-04-09 PROCEDURE — 36415 COLL VENOUS BLD VENIPUNCTURE: CPT

## 2024-04-09 PROCEDURE — 80061 LIPID PANEL: CPT

## 2024-04-16 ENCOUNTER — OFFICE VISIT (OUTPATIENT)
Dept: PRIMARY CARE | Facility: CLINIC | Age: 79
End: 2024-04-16
Payer: MEDICARE

## 2024-04-16 VITALS
HEART RATE: 70 BPM | DIASTOLIC BLOOD PRESSURE: 70 MMHG | SYSTOLIC BLOOD PRESSURE: 130 MMHG | WEIGHT: 171.6 LBS | BODY MASS INDEX: 33.51 KG/M2 | TEMPERATURE: 98.1 F | RESPIRATION RATE: 16 BRPM

## 2024-04-16 DIAGNOSIS — R73.03 PREDIABETES: ICD-10-CM

## 2024-04-16 DIAGNOSIS — R35.1 NOCTURIA: ICD-10-CM

## 2024-04-16 DIAGNOSIS — E78.00 ELEVATED LDL CHOLESTEROL LEVEL: ICD-10-CM

## 2024-04-16 DIAGNOSIS — I10 BENIGN ESSENTIAL HYPERTENSION: Primary | ICD-10-CM

## 2024-04-16 DIAGNOSIS — J43.9 PULMONARY EMPHYSEMA, UNSPECIFIED EMPHYSEMA TYPE (MULTI): ICD-10-CM

## 2024-04-16 PROCEDURE — 1123F ACP DISCUSS/DSCN MKR DOCD: CPT | Performed by: INTERNAL MEDICINE

## 2024-04-16 PROCEDURE — 1158F ADVNC CARE PLAN TLK DOCD: CPT | Performed by: INTERNAL MEDICINE

## 2024-04-16 PROCEDURE — 3075F SYST BP GE 130 - 139MM HG: CPT | Performed by: INTERNAL MEDICINE

## 2024-04-16 PROCEDURE — 3078F DIAST BP <80 MM HG: CPT | Performed by: INTERNAL MEDICINE

## 2024-04-16 PROCEDURE — 99214 OFFICE O/P EST MOD 30 MIN: CPT | Performed by: INTERNAL MEDICINE

## 2024-04-16 PROCEDURE — 1159F MED LIST DOCD IN RCRD: CPT | Performed by: INTERNAL MEDICINE

## 2024-04-16 PROCEDURE — 1036F TOBACCO NON-USER: CPT | Performed by: INTERNAL MEDICINE

## 2024-04-16 NOTE — PROGRESS NOTES
Nani Bello is a 79 y.o. female   Patient with a past medical history of HTN, HLD, COPD, Elevated CAC with a normal stress test (2022)Hyperglycemia/ Pre Diabetes, Schizoaffective disorder, DJD, Osteopenia (Oct 2023), Mammogram (Feb), Colonoscopy up to date     Feels fine  No chest pain/  SOB/ dizziness  BM OK  Energy level ok  Appetite OK      Nocturia x 3         Review of Systems     Constitutional: no fever, no chills, not feeling poorly, not feeling tired and no recent weight gain, no recent weight loss.   ENT: no earache, no hearing loss, no nosebleeds, no nasal discharge, no sore throat and no hoarseness.   Cardiovascular: the heart rate was not slow, the heart rate was not fast, no chest pain, no palpitations, no intermittent leg claudication and no lower extremity edema.   Respiratory: no cough, wheezing or shortness of breath at rest or exertion  Gastrointestinal: no abdominal pain, no constipation, no melena, no nausea, no diarrhea, no vomiting and no blood in stools.   Musculoskeletal: no arthralgias, no myalgias, no back pain, no joint swelling, no joint stiffness, no limb pain and no limb swelling.   Integumentary: no skin rashes, no skin lesions, no itching, no skin wound and no dry skin.   Neurological: no headache, no confusion, no numbness, no dizziness, no tingling and no fainting.   All other systems have been reviewed and are negative for complaint.       Vitals:    04/16/24 1045   BP: 130/70   Pulse: 70   Resp: 16   Temp: 36.7 °C (98.1 °F)        Physical Exam     Constitutional   General appearance: Alert and in no acute distress.     Pulmonary   Respiratory assessment: No respiratory distress, normal respiratory rhythm and effort.    Auscultation of Lungs: Clear bilateral breath sounds.   Cardiovascular   Auscultation of heart: Apical pulse normal, heart rate and rhythm normal, normal S1 and S2, no murmurs and no pericardial rub.    Exam for edema: No peripheral edema.   Abdomen   Abdominal  Exam: No bruits, normal bowel sounds, soft, non-tender, no abdominal mass palpated.    Liver and Spleen exam: No hepato-splenomegaly.   Musculoskeletal   Examination of gait: Normal.    Inspection of digits and nails: No clubbing or cyanosis of the fingernails.    Inspection/palpation of joints, bones and muscles: No joint swelling. Normal movement of all extremities.   Skin   Skin inspection: Normal skin color and pigmentation, normal skin turgor and no visible rash.   Neurologic   Cranial nerves: Nerves 2-12 were intact, no focal neuro defects.     Assessment/Plan          Patient with a past medical history of HTN, HLD, COPD, Elevated CAC with a normal stress test (2022)Hyperglycemia/ Pre Diabetes, Schizoaffective disorder, DJD, Osteopenia (Oct 2023), Mammogram (Feb), Colonoscopy up to date      #Hypertension  Stable continue home medications     #Dyslipidemia  Stable  Continue current medications  Watch salt, avoid excessive caffeine  Avoid processed meats/ sugars/juices Instead eat fresh fruit  Add walnuts and almonds to your diet  exercise 6 days a week for 30 minutes     #COPD  Does get dyspnea on exertion but overall stable     #Prediabetes  Continue exercise and healthy eating habits  Weight loss     #Osteopenia  Check bone density pending     # Nocturia  Cut back on night time fluid intake 3 hours before bedtime

## 2024-04-26 ENCOUNTER — HOSPITAL ENCOUNTER (OUTPATIENT)
Dept: RADIOLOGY | Facility: CLINIC | Age: 79
Discharge: HOME | End: 2024-04-26
Payer: MEDICARE

## 2024-04-26 VITALS — WEIGHT: 171.52 LBS | HEIGHT: 60 IN | BODY MASS INDEX: 33.67 KG/M2

## 2024-04-26 DIAGNOSIS — Z12.31 SCREENING MAMMOGRAM, ENCOUNTER FOR: ICD-10-CM

## 2024-04-26 PROCEDURE — 77067 SCR MAMMO BI INCL CAD: CPT

## 2024-04-26 PROCEDURE — 77067 SCR MAMMO BI INCL CAD: CPT | Performed by: RADIOLOGY

## 2024-04-26 PROCEDURE — 77063 BREAST TOMOSYNTHESIS BI: CPT | Performed by: RADIOLOGY

## 2024-05-10 ENCOUNTER — HOSPITAL ENCOUNTER (OUTPATIENT)
Dept: RADIOLOGY | Facility: CLINIC | Age: 79
Discharge: HOME | End: 2024-05-10
Payer: MEDICARE

## 2024-05-10 DIAGNOSIS — M85.88 OSTEOPENIA OF LUMBAR SPINE: ICD-10-CM

## 2024-05-10 PROCEDURE — 77080 DXA BONE DENSITY AXIAL: CPT

## 2024-05-10 PROCEDURE — 77080 DXA BONE DENSITY AXIAL: CPT | Performed by: RADIOLOGY

## 2024-05-15 DIAGNOSIS — I10 BENIGN ESSENTIAL HYPERTENSION: ICD-10-CM

## 2024-05-15 RX ORDER — AMLODIPINE BESYLATE 10 MG/1
10 TABLET ORAL DAILY
Qty: 90 TABLET | Refills: 0 | Status: SHIPPED | OUTPATIENT
Start: 2024-05-15 | End: 2024-09-12

## 2024-05-15 NOTE — TELEPHONE ENCOUNTER
Rx Refill Request Telephone Encounter    Name:  Nani Bello  :  [id number]  Specific Pharmacy location:  [facility]

## 2024-05-24 DIAGNOSIS — I10 BENIGN ESSENTIAL HYPERTENSION: ICD-10-CM

## 2024-05-24 RX ORDER — METOPROLOL SUCCINATE 25 MG/1
25 TABLET, EXTENDED RELEASE ORAL DAILY
Qty: 30 TABLET | Refills: 3 | Status: SHIPPED | OUTPATIENT
Start: 2024-05-24 | End: 2024-09-21

## 2024-05-24 NOTE — TELEPHONE ENCOUNTER
Rx Refill Request Telephone Encounter    Name:  Nani Bello  :  [id number]  Medication Name:  daniella

## 2024-07-01 ENCOUNTER — OFFICE VISIT (OUTPATIENT)
Dept: PRIMARY CARE | Facility: CLINIC | Age: 79
End: 2024-07-01
Payer: MEDICARE

## 2024-07-01 VITALS
RESPIRATION RATE: 16 BRPM | WEIGHT: 166.2 LBS | DIASTOLIC BLOOD PRESSURE: 80 MMHG | BODY MASS INDEX: 32.46 KG/M2 | HEART RATE: 72 BPM | SYSTOLIC BLOOD PRESSURE: 130 MMHG | TEMPERATURE: 98.3 F

## 2024-07-01 DIAGNOSIS — I10 BENIGN ESSENTIAL HYPERTENSION: ICD-10-CM

## 2024-07-01 DIAGNOSIS — M54.50 ACUTE LEFT-SIDED LOW BACK PAIN WITHOUT SCIATICA: Primary | ICD-10-CM

## 2024-07-01 PROCEDURE — 3075F SYST BP GE 130 - 139MM HG: CPT | Performed by: INTERNAL MEDICINE

## 2024-07-01 PROCEDURE — 1036F TOBACCO NON-USER: CPT | Performed by: INTERNAL MEDICINE

## 2024-07-01 PROCEDURE — 3079F DIAST BP 80-89 MM HG: CPT | Performed by: INTERNAL MEDICINE

## 2024-07-01 PROCEDURE — 1157F ADVNC CARE PLAN IN RCRD: CPT | Performed by: INTERNAL MEDICINE

## 2024-07-01 PROCEDURE — 99214 OFFICE O/P EST MOD 30 MIN: CPT | Performed by: INTERNAL MEDICINE

## 2024-07-01 PROCEDURE — 1159F MED LIST DOCD IN RCRD: CPT | Performed by: INTERNAL MEDICINE

## 2024-07-01 RX ORDER — CYCLOBENZAPRINE HCL 5 MG
5 TABLET ORAL 3 TIMES DAILY PRN
Qty: 30 TABLET | Refills: 0 | Status: SHIPPED | OUTPATIENT
Start: 2024-07-01 | End: 2024-08-30

## 2024-07-01 RX ORDER — METHYLPREDNISOLONE 4 MG/1
TABLET ORAL
Qty: 21 TABLET | Refills: 0 | Status: SHIPPED | OUTPATIENT
Start: 2024-07-01 | End: 2024-07-08

## 2024-07-01 NOTE — PROGRESS NOTES
Nani Bello is a 79 y.o. female   Patient with a past medical history of HTN, HLD, COPD, Elevated CAC with a normal stress test (2022)Hyperglycemia/ Pre Diabetes, Schizoaffective disorder, DJD, Osteopenia (Oct 2023), Mammogram (Feb), Colonoscopy up to date     Was pushing a window open  Started having pain in lower back and left hip         Review of Systems     Constitutional: no fever, no chills,     Gastrointestinal: no abdominal pain, no constipation, no melena, no nausea, no diarrhea, no vomiting and no blood in stools.   Musculoskeletal: Back pain/slight hip pain    Neurological:  All other systems have been reviewed and are negative for complaint.       Vitals:    07/01/24 1235   BP: 130/80   Pulse: 72   Resp: 16   Temp: 36.8 °C (98.3 °F)        Physical Exam     Constitutional   General appearance: Alert and in no acute distress.     Pulmonary   Respiratory assessment: No respiratory distress, normal respiratory rhythm and effort.    Auscultation of Lungs: Clear bilateral breath sounds.   Cardiovascular   Auscultation of heart: Apical pulse normal, heart rate and rhythm normal, normal S1 and S2, no murmurs and no pericardial rub.    Exam for edema: No peripheral edema.   Abdomen   Abdominal Exam: No bruits, normal bowel sounds, soft, non-tender, no abdominal mass palpated.    Liver and Spleen exam: No hepato-splenomegaly.   Musculoskeletal   Examination of gait: Slight limp   Inspection of digits and nails: No clubbing or cyanosis of the fingernails.    Inspection/palpation of joints, bones and muscles: No joint swelling. Normal movement of all extremities.   Skin   Skin inspection: Normal skin color and pigmentation, normal skin turgor and no visible rash.   Neurologic   Cranial nerves: Nerves 2-12 were intact, no focal neuro defects.     Assessment/Plan          Patient with a past medical history of HTN, HLD, COPD, Elevated CAC with a normal stress test (2022)Hyperglycemia/ Pre Diabetes,  Schizoaffective disorder, DJD, Osteopenia (Oct 2023), Mammogram (Feb), Colonoscopy up to date     #Acute back strain  Radiates down to the left hip area  Continue heat  Stretching exercises  Start Medrol Dosepak  Flexeril 5 mg p.o. 3 times a day as needed for spasms  Should improve within a few days     #Hypertension  Stable continue home medications

## 2024-07-23 ENCOUNTER — APPOINTMENT (OUTPATIENT)
Dept: PRIMARY CARE | Facility: CLINIC | Age: 79
End: 2024-07-23
Payer: MEDICARE

## 2024-07-23 ENCOUNTER — LAB (OUTPATIENT)
Dept: LAB | Facility: LAB | Age: 79
End: 2024-07-23
Payer: MEDICARE

## 2024-07-23 VITALS
BODY MASS INDEX: 32.22 KG/M2 | SYSTOLIC BLOOD PRESSURE: 120 MMHG | WEIGHT: 165 LBS | RESPIRATION RATE: 16 BRPM | HEART RATE: 70 BPM | TEMPERATURE: 98.9 F | DIASTOLIC BLOOD PRESSURE: 70 MMHG

## 2024-07-23 DIAGNOSIS — J44.9 CHRONIC OBSTRUCTIVE PULMONARY DISEASE, UNSPECIFIED COPD TYPE (MULTI): ICD-10-CM

## 2024-07-23 DIAGNOSIS — M19.91 PRIMARY OSTEOARTHRITIS, UNSPECIFIED SITE: ICD-10-CM

## 2024-07-23 DIAGNOSIS — E13.69 OTHER SPECIFIED DIABETES MELLITUS WITH OTHER SPECIFIED COMPLICATION, UNSPECIFIED WHETHER LONG TERM INSULIN USE (MULTI): ICD-10-CM

## 2024-07-23 DIAGNOSIS — E78.00 ELEVATED LDL CHOLESTEROL LEVEL: ICD-10-CM

## 2024-07-23 DIAGNOSIS — F25.9 SCHIZOAFFECTIVE DISORDER, IN REMISSION (MULTI): ICD-10-CM

## 2024-07-23 DIAGNOSIS — E13.69 OTHER SPECIFIED DIABETES MELLITUS WITH OTHER SPECIFIED COMPLICATION, UNSPECIFIED WHETHER LONG TERM INSULIN USE (MULTI): Primary | ICD-10-CM

## 2024-07-23 DIAGNOSIS — R73.03 PREDIABETES: ICD-10-CM

## 2024-07-23 DIAGNOSIS — I10 BENIGN ESSENTIAL HYPERTENSION: ICD-10-CM

## 2024-07-23 LAB
ANION GAP SERPL CALC-SCNC: 12 MMOL/L (ref 10–20)
BUN SERPL-MCNC: 15 MG/DL (ref 6–23)
CALCIUM SERPL-MCNC: 9.8 MG/DL (ref 8.6–10.3)
CHLORIDE SERPL-SCNC: 101 MMOL/L (ref 98–107)
CO2 SERPL-SCNC: 30 MMOL/L (ref 21–32)
CREAT SERPL-MCNC: 0.87 MG/DL (ref 0.5–1.05)
EGFRCR SERPLBLD CKD-EPI 2021: 68 ML/MIN/1.73M*2
EST. AVERAGE GLUCOSE BLD GHB EST-MCNC: 134 MG/DL
GLUCOSE SERPL-MCNC: 94 MG/DL (ref 74–99)
HBA1C MFR BLD: 6.3 %
POTASSIUM SERPL-SCNC: 4.4 MMOL/L (ref 3.5–5.3)
SODIUM SERPL-SCNC: 139 MMOL/L (ref 136–145)

## 2024-07-23 PROCEDURE — 1036F TOBACCO NON-USER: CPT | Performed by: INTERNAL MEDICINE

## 2024-07-23 PROCEDURE — 83036 HEMOGLOBIN GLYCOSYLATED A1C: CPT

## 2024-07-23 PROCEDURE — 36415 COLL VENOUS BLD VENIPUNCTURE: CPT

## 2024-07-23 PROCEDURE — 1159F MED LIST DOCD IN RCRD: CPT | Performed by: INTERNAL MEDICINE

## 2024-07-23 PROCEDURE — 1157F ADVNC CARE PLAN IN RCRD: CPT | Performed by: INTERNAL MEDICINE

## 2024-07-23 PROCEDURE — 3078F DIAST BP <80 MM HG: CPT | Performed by: INTERNAL MEDICINE

## 2024-07-23 PROCEDURE — 80048 BASIC METABOLIC PNL TOTAL CA: CPT

## 2024-07-23 PROCEDURE — 99214 OFFICE O/P EST MOD 30 MIN: CPT | Performed by: INTERNAL MEDICINE

## 2024-07-23 PROCEDURE — 3074F SYST BP LT 130 MM HG: CPT | Performed by: INTERNAL MEDICINE

## 2024-07-23 RX ORDER — ALBUTEROL SULFATE 90 UG/1
2 AEROSOL, METERED RESPIRATORY (INHALATION) EVERY 4 HOURS PRN
Qty: 8 G | Refills: 5 | Status: SHIPPED | OUTPATIENT
Start: 2024-07-23 | End: 2025-07-23

## 2024-07-23 RX ORDER — ATORVASTATIN CALCIUM 20 MG/1
20 TABLET, FILM COATED ORAL DAILY
Qty: 90 TABLET | Refills: 3 | Status: SHIPPED | OUTPATIENT
Start: 2024-07-23 | End: 2025-07-18

## 2024-07-23 RX ORDER — AMLODIPINE BESYLATE 10 MG/1
10 TABLET ORAL DAILY
Qty: 90 TABLET | Refills: 0 | Status: SHIPPED | OUTPATIENT
Start: 2024-07-23 | End: 2024-11-20

## 2024-07-23 RX ORDER — CELECOXIB 200 MG/1
200 CAPSULE ORAL DAILY
Qty: 90 CAPSULE | Refills: 2 | Status: SHIPPED | OUTPATIENT
Start: 2024-07-23

## 2024-07-23 NOTE — PROGRESS NOTES
Nani Bello is a 79 y.o. female   Patient with a past medical history of HTN, HLD, COPD, Elevated CAC with a normal stress test (2022)Hyperglycemia/ Pre Diabetes, Schizoaffective disorder, DJD, Osteopenia (Oct 2023), Mammogram (Feb), Colonoscopy up to date     Feels fine  No chest pain/  SOB/ dizziness  BM OK  Energy level ok  Appetite OK    Does get night time sweats           Review of Systems     Constitutional: no fever, no chills, not feeling poorly, not feeling tired and no recent weight gain, no recent weight loss.   ENT: no earache, no hearing loss, no nosebleeds, no nasal discharge, no sore throat and no hoarseness.   Cardiovascular: the heart rate was not slow, the heart rate was not fast, no chest pain, no palpitations, no intermittent leg claudication and no lower extremity edema.   Respiratory: no cough, wheezing or shortness of breath at rest or exertion  Gastrointestinal: no abdominal pain, no constipation, no melena, no nausea, no diarrhea, no vomiting and no blood in stools.   Musculoskeletal: no arthralgias, no myalgias, no back pain, no joint swelling, no joint stiffness, no limb pain and no limb swelling.   Integumentary: no skin rashes, no skin lesions, no itching, no skin wound and no dry skin.   Neurological: no headache, no confusion, no numbness, no dizziness, no tingling and no fainting.   All other systems have been reviewed and are negative for complaint.     Current Outpatient Medications   Medication Instructions    albuterol (Ventolin HFA) 90 mcg/actuation inhaler 2 puffs, inhalation, Every 4 hours PRN    amLODIPine (NORVASC) 10 mg, oral, Daily    aspirin 81 mg, oral, Daily    atorvastatin (LIPITOR) 20 mg, oral, Daily    betamethasone dipropionate 0.05 % cream Topical, 2 times daily    CALCIUM CARBONATE-VITAMIN D3 ORAL 1 tablet, oral, Daily    celecoxib (CELEBREX) 200 mg, oral, Daily    cetirizine (ZYRTEC) 10 mg, oral, Daily    cholecalciferol (VITAMIN D-3) 25 mcg, oral     cyclobenzaprine (FLEXERIL) 5 mg, oral, 3 times daily PRN    fluticasone propion-salmeteroL (Advair Diskus) 250-50 mcg/dose diskus inhaler 1 puff, inhalation, 2 times daily RT, Rinse mouth with water after use to reduce aftertaste and incidence of candidiasis. Do not swallow.    glucosamine-chondroitin 500-400 mg tablet 1 tablet, oral    ketoconazole (NIZOral) 2 % cream Topical, Daily    metoprolol succinate XL (TOPROL-XL) 25 mg, oral, Daily    multivit-iron-minerals-folic acid (Centrum Silver) 0.4 mg-300 mcg- 250 mcg tab oral         Vitals:    07/23/24 1109   BP: 120/70   Pulse: 70   Resp: 16   Temp: 37.2 °C (98.9 °F)        Physical Exam     Constitutional   General appearance: Alert and in no acute distress.     Pulmonary   Respiratory assessment: No respiratory distress, normal respiratory rhythm and effort.    Auscultation of Lungs: Clear bilateral breath sounds.   Cardiovascular   Auscultation of heart: Apical pulse normal, heart rate and rhythm normal, normal S1 and S2, no murmurs and no pericardial rub.    Exam for edema: No peripheral edema.   Abdomen   Abdominal Exam: No bruits, normal bowel sounds, soft, non-tender, no abdominal mass palpated.    Liver and Spleen exam: No hepato-splenomegaly.   Musculoskeletal   Examination of gait: Normal.    Inspection of digits and nails: No clubbing or cyanosis of the fingernails.    Inspection/palpation of joints, bones and muscles: No joint swelling. Normal movement of all extremities.   Skin   Skin inspection: Normal skin color and pigmentation, normal skin turgor and no visible rash.   Neurologic   Cranial nerves: Nerves 2-12 were intact, no focal neuro defects.    Assessment/Plan          Patient with a past medical history of HTN, HLD, COPD, Elevated CAC with a normal stress test (2022)Hyperglycemia/ Pre Diabetes, Schizoaffective disorder, DJD, Osteopenia (Oct 2023), Mammogram (Feb), Colonoscopy up to date     # HTN  Stable  Continue current medications    #  Prediabetes  Check A1c    # HLD  Stable  Continue current medications  Watch salt, avoid excessive caffeine  Avoid processed meats/ sugars/juices Instead eat fresh fruit  Add walnuts and almonds to your diet  exercise 6 days a week for 30 minutes    # COPD  Stable  Continue Albuterol PRN    # Schizoaffective disorder  In remission

## 2024-09-26 DIAGNOSIS — I10 BENIGN ESSENTIAL HYPERTENSION: ICD-10-CM

## 2024-09-26 RX ORDER — METOPROLOL SUCCINATE 25 MG/1
25 TABLET, EXTENDED RELEASE ORAL DAILY
Qty: 30 TABLET | Refills: 3 | Status: SHIPPED | OUTPATIENT
Start: 2024-09-26 | End: 2025-01-24

## 2024-09-26 NOTE — TELEPHONE ENCOUNTER
Rx Refill Request Telephone Encounter    Name:  Nani Bello  :  [id number]  Specific Pharmacy location:  [facility]

## 2024-10-25 ENCOUNTER — APPOINTMENT (OUTPATIENT)
Dept: PRIMARY CARE | Facility: CLINIC | Age: 79
End: 2024-10-25
Payer: MEDICARE

## 2024-10-25 VITALS
DIASTOLIC BLOOD PRESSURE: 70 MMHG | BODY MASS INDEX: 32.11 KG/M2 | RESPIRATION RATE: 16 BRPM | TEMPERATURE: 98.3 F | SYSTOLIC BLOOD PRESSURE: 130 MMHG | HEART RATE: 70 BPM | WEIGHT: 164.4 LBS

## 2024-10-25 DIAGNOSIS — I10 BENIGN ESSENTIAL HYPERTENSION: Primary | ICD-10-CM

## 2024-10-25 DIAGNOSIS — M77.8 TENDINITIS OF SHOULDER, UNSPECIFIED LATERALITY: ICD-10-CM

## 2024-10-25 DIAGNOSIS — J43.9 PULMONARY EMPHYSEMA, UNSPECIFIED EMPHYSEMA TYPE (MULTI): ICD-10-CM

## 2024-10-25 DIAGNOSIS — E78.00 ELEVATED LDL CHOLESTEROL LEVEL: ICD-10-CM

## 2024-10-25 PROCEDURE — 1157F ADVNC CARE PLAN IN RCRD: CPT | Performed by: INTERNAL MEDICINE

## 2024-10-25 PROCEDURE — 3078F DIAST BP <80 MM HG: CPT | Performed by: INTERNAL MEDICINE

## 2024-10-25 PROCEDURE — 99214 OFFICE O/P EST MOD 30 MIN: CPT | Performed by: INTERNAL MEDICINE

## 2024-10-25 PROCEDURE — 1159F MED LIST DOCD IN RCRD: CPT | Performed by: INTERNAL MEDICINE

## 2024-10-25 PROCEDURE — 3075F SYST BP GE 130 - 139MM HG: CPT | Performed by: INTERNAL MEDICINE

## 2024-10-25 PROCEDURE — G2211 COMPLEX E/M VISIT ADD ON: HCPCS | Performed by: INTERNAL MEDICINE

## 2024-10-25 PROCEDURE — 1160F RVW MEDS BY RX/DR IN RCRD: CPT | Performed by: INTERNAL MEDICINE

## 2024-10-25 NOTE — PROGRESS NOTES
Nani Bello is a 79 y.o. female   Patient with a past medical history of HTN, HLD, COPD, Elevated CAC with a normal stress test (2022)Hyperglycemia/ Pre Diabetes, Schizoaffective disorder, DJD, Osteopenia (Oct 2026), Mammogram (Feb), Colonoscopy up to date     Feels fine  Had 2 teeth removed because of an infection  Both shoulders have been hurting    No chest pain/  SOB/ dizziness  BM OK  Energy level ok  Appetite OK             Review of Systems     Constitutional: no fever, no chills, not feeling poorly, not feeling tired and no recent weight gain, no recent weight loss.   ENT: no earache, no hearing loss, no nosebleeds, no nasal discharge, no sore throat and no hoarseness.   Cardiovascular: the heart rate was not slow, the heart rate was not fast, no chest pain, no palpitations, no intermittent leg claudication and no lower extremity edema.   Respiratory: no cough, wheezing or shortness of breath at rest or exertion  Gastrointestinal: no abdominal pain, no constipation, no melena, no nausea, no diarrhea, no vomiting and no blood in stools.   Musculoskeletal: no arthralgias, no myalgias, no back pain, no joint swelling, no joint stiffness, no limb pain and no limb swelling.   Integumentary: no skin rashes, no skin lesions, no itching, no skin wound and no dry skin.   Neurological: no headache, no confusion, no numbness, no dizziness, no tingling and no fainting.   All other systems have been reviewed and are negative for complaint.     Current Outpatient Medications   Medication Instructions    albuterol (Ventolin HFA) 90 mcg/actuation inhaler 2 puffs, inhalation, Every 4 hours PRN    amLODIPine (NORVASC) 10 mg, oral, Daily    aspirin 81 mg, oral, Daily    atorvastatin (LIPITOR) 20 mg, oral, Daily    betamethasone dipropionate 0.05 % cream Topical, 2 times daily    CALCIUM CARBONATE-VITAMIN D3 ORAL 1 tablet, oral, Daily    celecoxib (CELEBREX) 200 mg, oral, Daily    cetirizine (ZYRTEC) 10 mg, oral, Daily     cholecalciferol (VITAMIN D-3) 25 mcg, oral    fluticasone propion-salmeteroL (Advair Diskus) 250-50 mcg/dose diskus inhaler 1 puff, inhalation, 2 times daily RT, Rinse mouth with water after use to reduce aftertaste and incidence of candidiasis. Do not swallow.    glucosamine-chondroitin 500-400 mg tablet 1 tablet, oral    ketoconazole (NIZOral) 2 % cream Topical, Daily    metoprolol succinate XL (TOPROL-XL) 25 mg, oral, Daily    multivit-iron-minerals-folic acid (Centrum Silver) 0.4 mg-300 mcg- 250 mcg tab oral         Vitals:    10/25/24 1107   Temp: 36.8 °C (98.3 °F)        Physical Exam     Constitutional   General appearance: Alert and in no acute distress.     Pulmonary   Respiratory assessment: No respiratory distress, normal respiratory rhythm and effort.    Auscultation of Lungs: Clear bilateral breath sounds.   Cardiovascular   Auscultation of heart: Apical pulse normal, heart rate and rhythm normal, normal S1 and S2, no murmurs and no pericardial rub.    Exam for edema: No peripheral edema.   Abdomen   Abdominal Exam: No bruits, normal bowel sounds, soft, non-tender, no abdominal mass palpated.    Liver and Spleen exam: No hepato-splenomegaly.   Musculoskeletal   Examination of gait: Normal.    Inspection of digits and nails: No clubbing or cyanosis of the fingernails.    Inspection/palpation of joints, bones and muscles: No joint swelling. Normal movement of all extremities.   Skin   Skin inspection: Normal skin color and pigmentation, normal skin turgor and no visible rash.   Neurologic   Cranial nerves: Nerves 2-12 were intact, no focal neuro defects.    Assessment/Plan            Patient with a past medical history of HTN, HLD, COPD, Elevated CAC with a normal stress test (2022)Hyperglycemia/ Pre Diabetes, Schizoaffective disorder, DJD, Osteopenia (Oct 2026), Mammogram (Feb), Colonoscopy up to date      # HTN  Stable  Continue current medications     # Prediabetes  A1c 6.3  Stable  Continue daily  exrcise  Walk 30 minutes daily     # HLD  Stable  Continue current medications  Watch salt, avoid excessive caffeine  Avoid processed meats/ sugars/juices Instead eat fresh fruit  Add walnuts and almonds to your diet  exercise 6 days a week for 30 minutes     # COPD  Stable  Continue Albuterol PRN     # Shoulder tendinitis  Taught stretching exercises

## 2024-12-27 ENCOUNTER — APPOINTMENT (OUTPATIENT)
Dept: PRIMARY CARE | Facility: CLINIC | Age: 79
End: 2024-12-27
Payer: MEDICARE

## 2024-12-27 VITALS
HEART RATE: 72 BPM | DIASTOLIC BLOOD PRESSURE: 70 MMHG | BODY MASS INDEX: 32.03 KG/M2 | TEMPERATURE: 98.6 F | RESPIRATION RATE: 16 BRPM | SYSTOLIC BLOOD PRESSURE: 130 MMHG | WEIGHT: 164 LBS

## 2024-12-27 DIAGNOSIS — R07.89 OTHER CHEST PAIN: ICD-10-CM

## 2024-12-27 DIAGNOSIS — I10 BENIGN ESSENTIAL HYPERTENSION: Primary | ICD-10-CM

## 2024-12-27 DIAGNOSIS — E78.00 ELEVATED LDL CHOLESTEROL LEVEL: ICD-10-CM

## 2024-12-27 DIAGNOSIS — R73.03 PREDIABETES: ICD-10-CM

## 2024-12-27 RX ORDER — METOPROLOL SUCCINATE 25 MG/1
25 TABLET, EXTENDED RELEASE ORAL DAILY
Qty: 30 TABLET | Refills: 3 | Status: SHIPPED | OUTPATIENT
Start: 2024-12-27 | End: 2025-04-26

## 2024-12-27 ASSESSMENT — ENCOUNTER SYMPTOMS
DEPRESSION: 0
LOSS OF SENSATION IN FEET: 0
OCCASIONAL FEELINGS OF UNSTEADINESS: 0

## 2024-12-27 NOTE — PROGRESS NOTES
"Nani Bello is a 79 y.o. female here for a Medicare Wellness Exam.    Chief Complaint   Patient presents with    Annual Exam         Patient with a past medical history of HTN, HLD, COPD, Elevated CAC with a normal stress test (2022)Hyperglycemia/ Pre Diabetes, Schizoaffective disorder, DJD, Osteopenia (Oct 2026), Mammogram (Feb), Colonoscopy up to date     Gets pain on left scapula (on top of scapula)  Comes and goes  At rest or activity  Lasts for 5-10 minutes  Movement does not make it worse      No chest pain/  SOB/ dizziness  BM OK  Energy level ok  Appetite OK             Medicare Wellness Exam    The patent is being seen for a follow up annual wellness visit  Past Medical, Surgical and family History: Reviewed and updated in chart  Interval History: Patient has not been hospitalized previously  Medications and Supplements: Review of all medications by a prescribing practitioner or clinical pharmacist (such as prescriptions, OTC, Herbal therapies and supplements) documented in the medical record.    Patient Self-Assessment of health: Fair  Tobacco Use: No  Alcohol Use: No  Illicit drug use: No  Patient using opioids: No    Current Diet: in general, a \"healthy\" diet    Adequate fluid intake: No  Caffeine intake: Yes  Exercise frequency: moderately active    Depression/Suicide screening: PHQ2/ PHQ9 (see screenings tab)    Hearing impairment: No  Uses hearing aids N/A  Cognitive impairment Observation: No   Patient or family reported cognitive impairment: No    Bathing: independent  Dressing: independent  Walking: independent  Taking Medications: independent  Feeding: independent  Personal Hygiene: independent  Managing Finances: independent  Shopping: independent  Housework/Basic Home Maintenance: independent  Handling transportation: independent  Preparing meals: independent    Bowels: continent  Bladder: continent    Falls Risk: has notfallen in last 6 months.   Their fall has not resulted in an " injury.   Fall risk Factors: Fall Risk Factors: Antihypertensive Use none   Care Plan Risk: Care Plan: Low/Moderate Risk: Regular physical activity such as walking, water aerobics or casper chi to improve strength, balance, coordination and flexibility. Wear appropriate, sensible shoe wear. Remove fall hazards at home such as loose rugs, obstacles, use non-slip surface in bath or shower. Keep living space well lit.      Home Safety Risk Factors: Home Safety Risk Factors: None  Advanced Directives:  Living will: Yes POA: Yes    Patient's End of Life Decisions: Provider agree to follow.      Past Medical History:   Diagnosis Date    Personal history of other mental and behavioral disorders     History of psychiatric hospitalization        Current Outpatient Medications   Medication Instructions    albuterol (Ventolin HFA) 90 mcg/actuation inhaler 2 puffs, inhalation, Every 4 hours PRN    amLODIPine (NORVASC) 10 mg, oral, Daily    aspirin 81 mg, oral, Daily    atorvastatin (LIPITOR) 20 mg, oral, Daily    betamethasone dipropionate 0.05 % cream Topical, 2 times daily    CALCIUM CARBONATE-VITAMIN D3 ORAL 1 tablet, oral, Daily    celecoxib (CELEBREX) 200 mg, oral, Daily    cetirizine (ZYRTEC) 10 mg, oral, Daily    cholecalciferol (VITAMIN D-3) 25 mcg, oral    fluticasone propion-salmeteroL (Advair Diskus) 250-50 mcg/dose diskus inhaler 1 puff, inhalation, 2 times daily RT, Rinse mouth with water after use to reduce aftertaste and incidence of candidiasis. Do not swallow.    glucosamine-chondroitin 500-400 mg tablet 1 tablet, oral    ketoconazole (NIZOral) 2 % cream Topical, Daily    metoprolol succinate XL (TOPROL-XL) 25 mg, oral, Daily    multivit-iron-minerals-folic acid (Centrum Silver) 0.4 mg-300 mcg- 250 mcg tab oral       Review of Systems     Constitutional: no fever, no chills, not feeling poorly, not feeling tired and no recent weight gain, no recent weight loss.   ENT: no earache, no hearing loss, no nosebleeds, no  nasal discharge, no sore throat and no hoarseness.   Cardiovascular: the heart rate was not slow, the heart rate was not fast, no chest pain, no palpitations, no intermittent leg claudication and no lower extremity edema.   Respiratory: no cough, wheezing or shortness of breath at rest or exertion  Gastrointestinal: no abdominal pain, no constipation, no melena, no nausea, no diarrhea, no vomiting and no blood in stools.   Musculoskeletal: no arthralgias, no myalgias, no back pain, no joint swelling, no joint stiffness, no limb pain and no limb swelling.   Integumentary: no skin rashes, no skin lesions, no itching, no skin wound and no dry skin.   Neurological: no headache, no confusion, no numbness, no dizziness, no tingling and no fainting.   All other systems have been reviewed and are negative for complaint.        Physical Exam    Constitutional   General appearance: Alert and in no acute distress.     Pulmonary   Respiratory assessment: No respiratory distress, normal respiratory rhythm and effort.    Auscultation of Lungs: Clear bilateral breath sounds.   Cardiovascular   Auscultation of heart: Apical pulse normal, heart rate and rhythm normal, normal S1 and S2, no murmurs and no pericardial rub.    Exam for edema: No peripheral edema.   Abdomen   Abdominal Exam: No bruits, normal bowel sounds, soft, non-tender, no abdominal mass palpated.    Liver and Spleen exam: No hepato-splenomegaly.   Musculoskeletal   Examination of gait: Normal.    Inspection of digits and nails: No clubbing or cyanosis of the fingernails.    Inspection/palpation of joints, bones and muscles: No joint swelling. Normal movement of all extremities.   Skin   Skin inspection: Normal skin color and pigmentation, normal skin turgor and no visible rash.   Neurologic   Cranial nerves: Nerves 2-12 were intact, no focal neuro defects.       Assessment/Plan      na      Advance Directives Discussion  less than 30 minutes spent discussing  Advanced Care Planning (including a Living Will, Healthcare POA, as well as specific end of life choices and/or directives). The details of that discussion were documented in Advanced Directives Discussion section of the medical record.       Patient with a past medical history of HTN, HLD, COPD, Elevated CAC with a normal stress test (2022)Hyperglycemia/ Pre Diabetes, Schizoaffective disorder, DJD, Osteopenia (Oct 2026), Mammogram (Feb), Colonoscopy up to date      # HTN  Stable  Continue current medications    #Scapular pain  EKG OK  Suspect cervical pain     # Prediabetes  A1c 6.3  Stable  Continue daily exrcise  Walk 30 minutes daily     # HLD  Stable  Continue current medications  Watch salt, avoid excessive caffeine  Avoid processed meats/ sugars/juices Instead eat fresh fruit  Add walnuts and almonds to your diet  exercise 6 days a week for 30 minutes     # COPD  Stable  Continue Albuterol PRN     # Shoulder tendinitis  Taught stretching exercises  Depression Screening  __3 minutes  were spent screening for depression using PHQ2/PHQ9 as documented in the chart.     Alcohol Screening  _1_ minutes were spent screening for alcohol use/misuse as documented in the chart.          Cardiac Risk Assessment  __minutes were spent assessing and discussing cardiovascular risk was and, if needed, lifestyle modifications recommended, including nutritional choices, exercise, and elimination of habits contributing to risk. Aspirin use/disuse was discussed following the guidelines below.     Low dose ASA ( mg) should be considered:  If you have prior Heart Attack/Stroke/Peripheral vascular disease:  Generally recommend daily low dose aspirin unless extremely high bleeding risk (e.g., gastrointestinal).     If you do not have prior Heart Attack/Stroke/Peripheral vascular disease:    Age < 70 and your 10-year cardiovascular disease risk is >20%, use low dose Aspirin   Age >=70: Do not use Aspirin for prevention  Low Dose  CT Screening  We discussed the pros and cons of low dose CT screening for lung cancer based on the patient's smoking history.  This screening is recommended for patients who:  Are between the age of 50 to 77  Have a 20 pack-year smoking history (20 years of smoking a pack a day)  Are either a current smoker or have quit smoking within the last 15 years  Are in good health - no new cough or unexplained weight loss  Are willing to do the follow-up testing and treatment, if needed  Have not had a chest CT (CAT) scan in the last year

## 2025-01-29 DIAGNOSIS — I10 BENIGN ESSENTIAL HYPERTENSION: ICD-10-CM

## 2025-01-29 RX ORDER — AMLODIPINE BESYLATE 10 MG/1
10 TABLET ORAL DAILY
Qty: 90 TABLET | Refills: 0 | Status: SHIPPED | OUTPATIENT
Start: 2025-01-29 | End: 2025-05-29

## 2025-01-31 ENCOUNTER — TELEMEDICINE (OUTPATIENT)
Dept: PRIMARY CARE | Facility: CLINIC | Age: 80
End: 2025-01-31
Payer: MEDICARE

## 2025-01-31 DIAGNOSIS — H92.02 LEFT EAR PAIN: Primary | ICD-10-CM

## 2025-01-31 PROCEDURE — 99213 OFFICE O/P EST LOW 20 MIN: CPT | Performed by: FAMILY MEDICINE

## 2025-01-31 PROCEDURE — 1157F ADVNC CARE PLAN IN RCRD: CPT | Performed by: FAMILY MEDICINE

## 2025-01-31 RX ORDER — DIPHENHYDRAMINE HCL 25 MG
TABLET ORAL
COMMUNITY

## 2025-01-31 RX ORDER — LORATADINE 10 MG/1
1 TABLET ORAL DAILY
COMMUNITY
Start: 2020-01-21

## 2025-01-31 NOTE — PROGRESS NOTES
I performed this visit using real-time telehealth tools, including an audio/video OR telephone connection between the patient listed who was located in the [facility] and myself, Tina Taylor (Board certified in the [facility]).At the start of the visit, I introduced myself as Dr. Galvin and verified the patients name, , and current physical location.  If they were currently outside of the state of [place], the visit was ended and the patient was referred to alternative means for evaluation and treatment.  The patient was made aware of the limitations of the telehealth visit.  They will not be physically examined and all issues may not be appropriate for a telehealth visit.  If necessary, an in-    In preparing for this visit and writing this note, I reviewed previous electronic medical records (labs, imaging and medical charts) of the patient available in the physician portal. Significant findings which helped in decision making are recorded in this encounter charting.  All allergies were reviewed with the patient and all medications reconciled verbally with the patient at the beginning oft the visit.    Chief complaint:     Pain around left earlobe--swollen right underneath--  If presses in front of ear it is tender  No redness-  Feels larger on left LAD and tender  No mastoid TTP  Earlobe swollen at entrance to ear canal--almost closed this am and almost couldn't hear--sl sticky stuff on it this am  Inside ear was itchy 3 days ago--   Never had before--   Feels warm--  No fever chills aches  No skin infection  No GI sxs  This week a little soft stools --went 4 x a day a few times this week and it has calmed down  No dizziness--          All other ROS (-)    General appearance:  Vitals available from patient? no  Alert, oriented, pleasant, in no apparent distress? yes  Answers questions appropriately? yes  Eyes clear? yes  Is patient in respiratory distress? no  Throat exam: not available  Is patient  coughing during consult: no  Audible wheezing noted? : no  Pt sounds congested?:  no  Sniffing or rhinorrhea?:  no  Frontal sinus TTP by palpation?  no  Maxillary sinus TTP by palpation? no  Tender neck LAD by pt exam?: no  Psychiatric: Affect normal? yes  Other relevant physical exam:    Assessment and Plan:    Sinusitis:  Sinus sxs for at least 7 days and getting worse or 10 days with no improvement  Augmentin 875mg twice a day for 7 days  Z-tyrese as directed  Doxycycline 100mg twice a day for 7 days  No chance of pregnancy verified  Take with a full 8oz glass of water and stay upright for 1h after taking--do not want the pill to get stuck in the esophagus (food tube) as it may burn that mucosa  Photosensitivity-increased risk of sunburn or rash if exposed to sun.  Wear sunscreen and reapply--cover exposed skin and avoid sun exposure    COVID INFECTION:  Paxlovid discussed--how to take and how it works and adverse effects--paxlovid prescribed? {YES/NO:23685}  Discussed stopping statin x 7 days   Kidney function reviewed   Discussed current isolation guidelines per the CDC  Vaccine booster recommended in 3 months if has not had new fall 2024 booster yet    Viral illness:  Discussed possible viral etiologies including:  COVID (recommend COVID testing if not done or, repeat if test was (-) and symptoms are consistent with potential COVID ), RSV, influenza A/B (Influenza is the SUDDEN ONSET of fevers, chills, body aches, cough, congestion, runny nose, and overwhelming fatigue) and other possible viruses.  Also discussed possibility of a bacterial infection such as pneumonia (including Mycoplasma.)  At this point, no antibiotics are needed.  Antibiotics do not kill viruses and they will not help you feel better any sooner.      Discussed with patient during visit  differential diagnosis; viral versus bacterial infection;  (if relevant); use of medications prescribed and possible side effects; appropriate over-the-counter  medications; possible complications ; when to follow-up for in person evaluation.  All patient's questions were answered. Patient has good decision making capacity.  They are alert to person, place, time and situation.  Patient has the ability to communicate choice, understand information, consequences, and reason rationally.  If sent for in person care at  or ED,    I explained why Urgent in person exam was necessary and that failure to have further evaluation, and treatment today may lead to, negative outcomes, permanent disability, or even death.   If not sent for in person care today,   follow-up with your PCP in 2-3 days, sooner if not  improving.    Follow-up immediately if symptoms worsen.   If experiencing symptoms including but not limited to lethargy / chest pain / weakness / dizziness / difficulty breathing please call 911 or go to the closest emergency department for immediate care.   Limitations to telemedicine include inability to do a complete and accurate physical exam.    Any concerns regarding this were conveyed with the patient and in person follow-up recommended if the nature of their concern/illness does not progress as anticipated during this visit.

## 2025-02-02 ENCOUNTER — OFFICE VISIT (OUTPATIENT)
Dept: URGENT CARE | Age: 80
End: 2025-02-02
Payer: MEDICARE

## 2025-02-02 VITALS
HEIGHT: 60 IN | OXYGEN SATURATION: 95 % | DIASTOLIC BLOOD PRESSURE: 77 MMHG | WEIGHT: 162 LBS | HEART RATE: 94 BPM | TEMPERATURE: 97.9 F | BODY MASS INDEX: 31.8 KG/M2 | SYSTOLIC BLOOD PRESSURE: 134 MMHG

## 2025-02-02 DIAGNOSIS — S03.40XA SPRAIN OF TEMPOROMANDIBULAR JOINT, INITIAL ENCOUNTER: Primary | ICD-10-CM

## 2025-02-02 PROCEDURE — 1159F MED LIST DOCD IN RCRD: CPT | Performed by: FAMILY MEDICINE

## 2025-02-02 PROCEDURE — 1157F ADVNC CARE PLAN IN RCRD: CPT | Performed by: FAMILY MEDICINE

## 2025-02-02 PROCEDURE — 99203 OFFICE O/P NEW LOW 30 MIN: CPT | Performed by: FAMILY MEDICINE

## 2025-02-02 PROCEDURE — 3078F DIAST BP <80 MM HG: CPT | Performed by: FAMILY MEDICINE

## 2025-02-02 PROCEDURE — 1160F RVW MEDS BY RX/DR IN RCRD: CPT | Performed by: FAMILY MEDICINE

## 2025-02-02 PROCEDURE — 3075F SYST BP GE 130 - 139MM HG: CPT | Performed by: FAMILY MEDICINE

## 2025-02-02 PROCEDURE — 1036F TOBACCO NON-USER: CPT | Performed by: FAMILY MEDICINE

## 2025-02-02 ASSESSMENT — ENCOUNTER SYMPTOMS
SINUS PAIN: 0
COUGH: 0
CHEST TIGHTNESS: 0
SINUS PRESSURE: 0
WHEEZING: 0
SHORTNESS OF BREATH: 0
SORE THROAT: 1
EYE PAIN: 0
CHILLS: 0
FEVER: 0
EYE DISCHARGE: 0
EYE REDNESS: 0

## 2025-02-02 NOTE — PROGRESS NOTES
Subjective   Patient ID: Nani Bello is a 80 y.o. female. They present today with a chief complaint of Earache (Left ear symptoms for 3 days. ).    History of Present Illness    History provided by:  Patient   used: No    Earache   There is pain in the left ear. This is a new problem. The current episode started in the past 7 days (1/30/25). The problem occurs constantly. The problem has been gradually worsening. There has been no fever. The pain is at a severity of 5/10. The pain is moderate. Associated symptoms include a sore throat. Pertinent negatives include no coughing. She has tried acetaminophen for the symptoms. The treatment provided significant relief.       Past Medical History  Allergies as of 02/02/2025 - Reviewed 02/02/2025   Allergen Reaction Noted    Ace inhibitors Angioedema and Other 08/11/2018    Benazepril Angioedema 02/08/2023    Naproxen Hives 12/23/2019    Nsaids (non-steroidal anti-inflammatory drug) Hives 06/30/2013    Penicillins Unknown, Swelling, and Hives 06/30/2013       (Not in a hospital admission)       Past Medical History:   Diagnosis Date    Personal history of other mental and behavioral disorders     History of psychiatric hospitalization       Past Surgical History:   Procedure Laterality Date    HYSTERECTOMY  06/23/2014    Hysterectomy        reports that she has never smoked. She has never been exposed to tobacco smoke. She has never used smokeless tobacco. She reports that she does not currently use alcohol. She reports that she does not currently use drugs.    Review of Systems  Review of Systems   Constitutional:  Negative for chills and fever.   HENT:  Positive for ear pain and sore throat. Negative for sinus pressure and sinus pain.    Eyes:  Negative for pain, discharge and redness.   Respiratory:  Negative for cough, chest tightness, shortness of breath and wheezing.    Cardiovascular:  Negative for chest pain.                                   Objective    Vitals:    02/02/25 1152   BP: 134/77   BP Location: Left arm   Patient Position: Sitting   BP Cuff Size: Adult   Pulse: 94   Temp: 36.6 °C (97.9 °F)   TempSrc: Oral   SpO2: 95%   Weight: 73.5 kg (162 lb)   Height: 1.524 m (5')     No LMP recorded. Patient has had a hysterectomy.    Physical Exam  Vitals reviewed.   Constitutional:       General: She is not in acute distress.     Appearance: She is not ill-appearing.   HENT:      Head:      Jaw: Tenderness and pain on movement present.      Comments: Pain over the Left TMJ.     Right Ear: Tympanic membrane and ear canal normal.      Left Ear: Tympanic membrane and ear canal normal.      Nose: Nose normal.      Mouth/Throat:      Mouth: Mucous membranes are moist.      Pharynx: No posterior oropharyngeal erythema.   Eyes:      Extraocular Movements: Extraocular movements intact.      Conjunctiva/sclera: Conjunctivae normal.      Pupils: Pupils are equal, round, and reactive to light.   Cardiovascular:      Rate and Rhythm: Normal rate and regular rhythm.      Heart sounds: No murmur heard.     No friction rub.   Pulmonary:      Effort: Pulmonary effort is normal. No respiratory distress.      Breath sounds: No wheezing, rhonchi or rales.   Lymphadenopathy:      Cervical: No cervical adenopathy.   Neurological:      Mental Status: She is alert.         Procedures    Point of Care Test & Imaging Results from this visit  No results found for this visit on 02/02/25.   No results found.    Diagnostic study results (if any) were reviewed by Dixon Nixon DO.    Assessment/Plan   Allergies, medications, history, and pertinent labs/EKGs/Imaging reviewed by Dixon Nixon DO.         Orders and Diagnoses  There are no diagnoses linked to this encounter.    Medical Admin Record      Patient disposition: Home    Electronically signed by Dixon Nixon DO  11:58 AM

## 2025-02-07 NOTE — PATIENT INSTRUCTIONS
"Follow up in person for evaluation of your ear pain    Please send me a MyChart message if you have any questions or concerns.  FOR NON URGENT questions only.  Allow up to 72 hours for response.    If you have prescription issues or other questions you can email   Maximino Philip [facility] Health Coordinator, at   [email or web address]     Rest and drink plenty of fluids    Tylenol and or motrin as needed for pain and fever (unless you have been told not to take these because of your personal medical history)    Discussed options and precautions (complaint specific and may include)  Viral versus bacterial infection; use of medications; possible side effects; appropriate over-the-counter medications; possible complications and /or when to follow-up.    if sent for in person care at  or ED,  it was explained why and failure to have \"higher level of care\" further evaluation, and treatment today may lead, but is not limited to negative outcomes, permanent disability, or even death.     All red flags requiring in person care were discussed.    If not sent for in person care today, follow-up with your PCP in 2-3 days, sooner if not  improving.    Follow-up immediately if symptoms worsen. If experiencing symptoms including but not limited to lethargy / chest pain / weakness / dizziness / difficulty breathing please call 911 or go to the emergency department for immediate care.     All patient's questions were answered. Patient has good decision making capacity.  They are alert to person, place, time and situation.  Patient has the ability to communicate choice, understand information, consequences, and reason rationally.      ____________________________________________________________________________________________________________  To connect with a new PCP please visit [email or web address] or call [phone]                          "

## 2025-04-02 DIAGNOSIS — J44.9 CHRONIC OBSTRUCTIVE PULMONARY DISEASE, UNSPECIFIED COPD TYPE (MULTI): ICD-10-CM

## 2025-04-03 RX ORDER — FLUTICASONE PROPIONATE AND SALMETEROL 250; 50 UG/1; UG/1
1 POWDER RESPIRATORY (INHALATION)
Qty: 60 EACH | Refills: 11 | Status: SHIPPED | OUTPATIENT
Start: 2025-04-03 | End: 2026-04-03

## 2025-04-08 ENCOUNTER — APPOINTMENT (OUTPATIENT)
Dept: PRIMARY CARE | Facility: CLINIC | Age: 80
End: 2025-04-08
Payer: MEDICARE

## 2025-04-08 VITALS
HEART RATE: 78 BPM | BODY MASS INDEX: 31.64 KG/M2 | TEMPERATURE: 98.3 F | DIASTOLIC BLOOD PRESSURE: 70 MMHG | RESPIRATION RATE: 16 BRPM | SYSTOLIC BLOOD PRESSURE: 150 MMHG | WEIGHT: 162 LBS

## 2025-04-08 DIAGNOSIS — R73.03 PREDIABETES: ICD-10-CM

## 2025-04-08 DIAGNOSIS — N39.41 URGE INCONTINENCE OF URINE: ICD-10-CM

## 2025-04-08 DIAGNOSIS — E78.00 ELEVATED LDL CHOLESTEROL LEVEL: ICD-10-CM

## 2025-04-08 DIAGNOSIS — E13.69 OTHER SPECIFIED DIABETES MELLITUS WITH OTHER SPECIFIED COMPLICATION, UNSPECIFIED WHETHER LONG TERM INSULIN USE (MULTI): ICD-10-CM

## 2025-04-08 DIAGNOSIS — I10 BENIGN ESSENTIAL HYPERTENSION: ICD-10-CM

## 2025-04-08 DIAGNOSIS — Z12.31 SCREENING MAMMOGRAM, ENCOUNTER FOR: Primary | ICD-10-CM

## 2025-04-08 DIAGNOSIS — F25.9 SCHIZOAFFECTIVE DISORDER, IN REMISSION (MULTI): ICD-10-CM

## 2025-04-08 DIAGNOSIS — J43.9 PULMONARY EMPHYSEMA, UNSPECIFIED EMPHYSEMA TYPE (MULTI): ICD-10-CM

## 2025-04-08 PROCEDURE — 3078F DIAST BP <80 MM HG: CPT | Performed by: INTERNAL MEDICINE

## 2025-04-08 PROCEDURE — 99214 OFFICE O/P EST MOD 30 MIN: CPT | Performed by: INTERNAL MEDICINE

## 2025-04-08 PROCEDURE — 3077F SYST BP >= 140 MM HG: CPT | Performed by: INTERNAL MEDICINE

## 2025-04-08 PROCEDURE — G2211 COMPLEX E/M VISIT ADD ON: HCPCS | Performed by: INTERNAL MEDICINE

## 2025-04-08 PROCEDURE — 1157F ADVNC CARE PLAN IN RCRD: CPT | Performed by: INTERNAL MEDICINE

## 2025-04-08 RX ORDER — METOPROLOL SUCCINATE 25 MG/1
50 TABLET, EXTENDED RELEASE ORAL DAILY
Qty: 30 TABLET | Refills: 3 | Status: SHIPPED | OUTPATIENT
Start: 2025-04-08 | End: 2025-08-06

## 2025-04-08 NOTE — PROGRESS NOTES
Nani Bello is a 80 y.o. female   Patient with a past medical history of HTN, HLD, COPD, Elevated CAC with a normal stress test (2022)Hyperglycemia/ Pre Diabetes, Schizoaffective disorder, DJD, Osteopenia (Oct 2026), Mammogram (Feb), Colonoscopy up to date     Feels fine  No chest pain/  SOB/ dizziness  BM OK  Energy level ok  Appetite OK    Nocturia x 2 with incontinence         Review of Systems     Constitutional: no fever, no chills, not feeling poorly, not feeling tired and no recent weight gain, no recent weight loss.   ENT: no earache, no hearing loss, no nosebleeds, no nasal discharge, no sore throat and no hoarseness.   Cardiovascular: the heart rate was not slow, the heart rate was not fast, no chest pain, no palpitations, no intermittent leg claudication and no lower extremity edema.   Respiratory: no cough, wheezing or shortness of breath at rest or exertion  Gastrointestinal: no abdominal pain, no constipation, no melena, no nausea, no diarrhea, no vomiting and no blood in stools.   Musculoskeletal: no arthralgias, no myalgias, no back pain, no joint swelling, no joint stiffness, no limb pain and no limb swelling.   Integumentary: no skin rashes, no skin lesions, no itching, no skin wound and no dry skin.   Neurological: no headache, no confusion, no numbness, no dizziness, no tingling and no fainting.   All other systems have been reviewed and are negative for complaint.     Current Outpatient Medications   Medication Instructions    albuterol (Ventolin HFA) 90 mcg/actuation inhaler 2 puffs, inhalation, Every 4 hours PRN    amLODIPine (NORVASC) 10 mg, oral, Daily    aspirin 81 mg, Daily    atorvastatin (LIPITOR) 20 mg, oral, Daily    betamethasone dipropionate 0.05 % cream 2 times daily    CALCIUM CARBONATE-VITAMIN D3 ORAL 1 tablet, Daily    celecoxib (CELEBREX) 200 mg, oral, Daily    cetirizine (ZYRTEC) 10 mg, Daily    cholecalciferol (VITAMIN D-3) 25 mcg    diphenhydrAMINE (Benadryl Allergy) 25 mg  tablet Take by mouth.    fluticasone propion-salmeteroL (Advair Diskus) 250-50 mcg/dose diskus inhaler 1 puff, inhalation, 2 times daily RT, Rinse mouth with water after use to reduce aftertaste and incidence of candidiasis. Do not swallow.    glucosamine-chondroitin 500-400 mg tablet 1 tablet    ketoconazole (NIZOral) 2 % cream Daily    loratadine (Claritin) 10 mg tablet 1 tablet, Daily    metoprolol succinate XL (TOPROL-XL) 25 mg, oral, Daily    multivit-iron-minerals-folic acid (Centrum Silver) 0.4 mg-300 mcg- 250 mcg tab Take by mouth.    multivitamin with minerals iron-free (multivitamin-iron-minerals-folic acid) 1 tablet, Daily RT         There were no vitals filed for this visit.     Physical Exam     Constitutional   General appearance: Alert and in no acute distress.     Pulmonary   Respiratory assessment: No respiratory distress, normal respiratory rhythm and effort.    Auscultation of Lungs: Clear bilateral breath sounds.   Cardiovascular   Auscultation of heart: Apical pulse normal, heart rate and rhythm normal, normal S1 and S2, no murmurs and no pericardial rub.    Exam for edema: No peripheral edema.   Abdomen   Abdominal Exam: No bruits, normal bowel sounds, soft, non-tender, no abdominal mass palpated.    Liver and Spleen exam: No hepato-splenomegaly.   Musculoskeletal   Examination of gait: Normal.    Inspection of digits and nails: No clubbing or cyanosis of the fingernails.    Inspection/palpation of joints, bones and muscles: No joint swelling. Normal movement of all extremities.   Skin   Skin inspection: Normal skin color and pigmentation, normal skin turgor and no visible rash.   Neurologic   Cranial nerves: Nerves 2-12 were intact, no focal neuro defects.    Assessment/Plan           Patient with a past medical history of HTN, HLD, COPD, Elevated CAC with a normal stress test (2022)Hyperglycemia/ Pre Diabetes, Schizoaffective disorder, DJD, Osteopenia (Oct 2026), Mammogram (Feb), Colonoscopy  up to date      # HTN  Uncontrolled  Increase Metoprolol to 50 mg  Continue current medications          # Prediabetes  Check blood work    Continue daily exrcise  Walk 30 minutes daily     # HLD  Stable  Continue current medications  Watch salt, avoid excessive caffeine  Avoid processed meats/ sugars/juices Instead eat fresh fruit  Add walnuts and almonds to your diet  exercise 6 days a week for 30 minutes     # COPD  Stable  Continue Albuterol PRN     # Urinary incontinence  Timed urination    # Schizoaffective disorder  In remission

## 2025-04-09 LAB
ALBUMIN SERPL-MCNC: 4.6 G/DL (ref 3.6–5.1)
ALP SERPL-CCNC: 51 U/L (ref 37–153)
ALT SERPL-CCNC: 24 U/L (ref 6–29)
ANION GAP SERPL CALCULATED.4IONS-SCNC: 12 MMOL/L (CALC) (ref 7–17)
AST SERPL-CCNC: 25 U/L (ref 10–35)
BILIRUB SERPL-MCNC: 0.7 MG/DL (ref 0.2–1.2)
BUN SERPL-MCNC: 13 MG/DL (ref 7–25)
CALCIUM SERPL-MCNC: 9.8 MG/DL (ref 8.6–10.4)
CHLORIDE SERPL-SCNC: 101 MMOL/L (ref 98–110)
CHOLEST SERPL-MCNC: 149 MG/DL
CHOLEST/HDLC SERPL: 2.1 (CALC)
CO2 SERPL-SCNC: 27 MMOL/L (ref 20–32)
CREAT SERPL-MCNC: 0.79 MG/DL (ref 0.6–0.95)
EGFRCR SERPLBLD CKD-EPI 2021: 76 ML/MIN/1.73M2
ERYTHROCYTE [DISTWIDTH] IN BLOOD BY AUTOMATED COUNT: 16.4 % (ref 11–15)
EST. AVERAGE GLUCOSE BLD GHB EST-MCNC: 137 MG/DL
EST. AVERAGE GLUCOSE BLD GHB EST-SCNC: 7.6 MMOL/L
GLUCOSE SERPL-MCNC: 96 MG/DL (ref 65–99)
HBA1C MFR BLD: 6.4 % OF TOTAL HGB
HCT VFR BLD AUTO: 44.7 % (ref 35–45)
HDLC SERPL-MCNC: 70 MG/DL
HGB BLD-MCNC: 13.6 G/DL (ref 11.7–15.5)
LDLC SERPL CALC-MCNC: 64 MG/DL (CALC)
MCH RBC QN AUTO: 23.1 PG (ref 27–33)
MCHC RBC AUTO-ENTMCNC: 30.4 G/DL (ref 32–36)
MCV RBC AUTO: 75.8 FL (ref 80–100)
NONHDLC SERPL-MCNC: 79 MG/DL (CALC)
PLATELET # BLD AUTO: 262 THOUSAND/UL (ref 140–400)
PMV BLD REES-ECKER: 11.3 FL (ref 7.5–12.5)
POTASSIUM SERPL-SCNC: 4.2 MMOL/L (ref 3.5–5.3)
PROT SERPL-MCNC: 7.6 G/DL (ref 6.1–8.1)
RBC # BLD AUTO: 5.9 MILLION/UL (ref 3.8–5.1)
SODIUM SERPL-SCNC: 140 MMOL/L (ref 135–146)
TRIGL SERPL-MCNC: 72 MG/DL
TSH SERPL-ACNC: 0.87 MIU/L (ref 0.4–4.5)
WBC # BLD AUTO: 8.4 THOUSAND/UL (ref 3.8–10.8)

## 2025-04-29 ENCOUNTER — HOSPITAL ENCOUNTER (OUTPATIENT)
Dept: RADIOLOGY | Facility: CLINIC | Age: 80
Discharge: HOME | End: 2025-04-29
Payer: MEDICARE

## 2025-04-29 VITALS — WEIGHT: 162 LBS | BODY MASS INDEX: 31.8 KG/M2 | HEIGHT: 60 IN

## 2025-04-29 DIAGNOSIS — Z12.31 SCREENING MAMMOGRAM, ENCOUNTER FOR: ICD-10-CM

## 2025-04-29 PROCEDURE — 77067 SCR MAMMO BI INCL CAD: CPT

## 2025-04-29 PROCEDURE — 77063 BREAST TOMOSYNTHESIS BI: CPT | Performed by: RADIOLOGY

## 2025-04-29 PROCEDURE — 77067 SCR MAMMO BI INCL CAD: CPT | Performed by: RADIOLOGY

## 2025-04-30 LAB
ALBUMIN/CREAT UR: 10 MG/G CREAT
CREAT UR-MCNC: 163 MG/DL (ref 20–275)
MICROALBUMIN UR-MCNC: 1.7 MG/DL

## 2025-05-07 DIAGNOSIS — I10 BENIGN ESSENTIAL HYPERTENSION: ICD-10-CM

## 2025-05-07 RX ORDER — AMLODIPINE BESYLATE 10 MG/1
10 TABLET ORAL DAILY
Qty: 90 TABLET | Refills: 0 | Status: SHIPPED | OUTPATIENT
Start: 2025-05-07 | End: 2025-09-04

## 2025-05-07 NOTE — TELEPHONE ENCOUNTER
Rx Refill Request Telephone Encounter    Name:  Nani Bello  :  [id number]  Specific Pharmacy location:  [facility]

## 2025-06-25 DIAGNOSIS — I10 BENIGN ESSENTIAL HYPERTENSION: ICD-10-CM

## 2025-06-25 RX ORDER — METOPROLOL SUCCINATE 25 MG/1
50 TABLET, EXTENDED RELEASE ORAL DAILY
Qty: 30 TABLET | Refills: 3 | Status: SHIPPED | OUTPATIENT
Start: 2025-06-25 | End: 2025-10-23

## 2025-06-25 NOTE — TELEPHONE ENCOUNTER
Rx Refill Request Telephone Encounter    Name:  Nani Bello  :  [id number]  Specific Pharmacy location: [facility]

## 2025-07-15 ENCOUNTER — APPOINTMENT (OUTPATIENT)
Dept: PRIMARY CARE | Facility: CLINIC | Age: 80
End: 2025-07-15
Payer: MEDICARE

## 2025-07-29 ENCOUNTER — APPOINTMENT (OUTPATIENT)
Dept: PRIMARY CARE | Facility: CLINIC | Age: 80
End: 2025-07-29
Payer: MEDICARE

## 2025-07-29 VITALS
HEART RATE: 68 BPM | BODY MASS INDEX: 32.81 KG/M2 | WEIGHT: 168 LBS | SYSTOLIC BLOOD PRESSURE: 136 MMHG | DIASTOLIC BLOOD PRESSURE: 70 MMHG | TEMPERATURE: 98 F | RESPIRATION RATE: 16 BRPM

## 2025-07-29 DIAGNOSIS — R73.03 PREDIABETES: Primary | ICD-10-CM

## 2025-07-29 DIAGNOSIS — E78.00 ELEVATED LDL CHOLESTEROL LEVEL: ICD-10-CM

## 2025-07-29 DIAGNOSIS — I10 BENIGN ESSENTIAL HYPERTENSION: ICD-10-CM

## 2025-07-29 DIAGNOSIS — M70.62 TROCHANTERIC BURSITIS OF LEFT HIP: ICD-10-CM

## 2025-07-29 PROCEDURE — 3078F DIAST BP <80 MM HG: CPT | Performed by: INTERNAL MEDICINE

## 2025-07-29 PROCEDURE — G2211 COMPLEX E/M VISIT ADD ON: HCPCS | Performed by: INTERNAL MEDICINE

## 2025-07-29 PROCEDURE — 99214 OFFICE O/P EST MOD 30 MIN: CPT | Performed by: INTERNAL MEDICINE

## 2025-07-29 PROCEDURE — 3075F SYST BP GE 130 - 139MM HG: CPT | Performed by: INTERNAL MEDICINE

## 2025-07-29 RX ORDER — AMLODIPINE BESYLATE 10 MG/1
10 TABLET ORAL DAILY
Qty: 90 TABLET | Refills: 1 | Status: SHIPPED | OUTPATIENT
Start: 2025-07-29 | End: 2026-01-25

## 2025-07-29 RX ORDER — METOPROLOL SUCCINATE 50 MG/1
50 TABLET, EXTENDED RELEASE ORAL DAILY
Qty: 90 TABLET | Refills: 1 | Status: SHIPPED | OUTPATIENT
Start: 2025-07-29 | End: 2026-01-25

## 2025-07-29 RX ORDER — ATORVASTATIN CALCIUM 20 MG/1
20 TABLET, FILM COATED ORAL DAILY
Qty: 90 TABLET | Refills: 3 | Status: SHIPPED | OUTPATIENT
Start: 2025-07-29 | End: 2026-07-24

## 2025-07-29 NOTE — PROGRESS NOTES
Nani Bello is a 80 y.o. female   Patient with a past medical history of HTN, HLD, COPD, Elevated CAC with a normal stress test (2022)Hyperglycemia/ Pre Diabetes, Schizoaffective disorder, DJD, Osteopenia (Oct 2026), Mammogram (Feb), Colonoscopy up to date     Gets left side hip pain  Off and on      No chest pain/  SOB/ dizziness  BM OK  Energy level ok  Appetite OK    Average blood pressures are under 120 at home               Review of Systems     Constitutional: no fever, no chills, not feeling poorly, not feeling tired and no recent weight gain, no recent weight loss.   ENT: no earache, no hearing loss, no nosebleeds, no nasal discharge, no sore throat and no hoarseness.   Cardiovascular: the heart rate was not slow, the heart rate was not fast, no chest pain, no palpitations, no intermittent leg claudication and no lower extremity edema.   Respiratory: no cough, wheezing or shortness of breath at rest or exertion  Gastrointestinal: no abdominal pain, no constipation, no melena, no nausea, no diarrhea, no vomiting and no blood in stools.   Musculoskeletal: no arthralgias, no myalgias, no back pain, no joint swelling, no joint stiffness, no limb pain and no limb swelling.   Integumentary: no skin rashes, no skin lesions, no itching, no skin wound and no dry skin.   Neurological: no headache, no confusion, no numbness, no dizziness, no tingling and no fainting.   All other systems have been reviewed and are negative for complaint.     Current Outpatient Medications   Medication Instructions    albuterol (Ventolin HFA) 90 mcg/actuation inhaler 2 puffs, inhalation, Every 4 hours PRN    amLODIPine (NORVASC) 10 mg, oral, Daily    aspirin 81 mg, Daily    atorvastatin (LIPITOR) 20 mg, oral, Daily    betamethasone dipropionate 0.05 % cream 2 times daily    CALCIUM CARBONATE-VITAMIN D3 ORAL 1 tablet, Daily    celecoxib (CELEBREX) 200 mg, oral, Daily    cetirizine (ZYRTEC) 10 mg, Daily    cholecalciferol (VITAMIN D-3)  25 mcg    diphenhydrAMINE (Benadryl Allergy) 25 mg tablet Take by mouth.    fluticasone propion-salmeteroL (Advair Diskus) 250-50 mcg/dose diskus inhaler 1 puff, inhalation, 2 times daily RT, Rinse mouth with water after use to reduce aftertaste and incidence of candidiasis. Do not swallow.    glucosamine-chondroitin 500-400 mg tablet 1 tablet    ketoconazole (NIZOral) 2 % cream Daily    loratadine (Claritin) 10 mg tablet 1 tablet, Daily    metoprolol succinate XL (TOPROL-XL) 50 mg, oral, Daily    multivit-iron-minerals-folic acid (Centrum Silver) 0.4 mg-300 mcg- 250 mcg tab Take by mouth.    multivitamin with minerals iron-free (multivitamin-iron-minerals-folic acid) 1 tablet, Daily RT         Vitals:    07/29/25 1140   BP: 136/70   Pulse: 68   Resp: 16   Temp: 36.7 °C (98 °F)        Physical Exam     Constitutional   General appearance: Alert and in no acute distress.     Pulmonary   Respiratory assessment: No respiratory distress, normal respiratory rhythm and effort.    Auscultation of Lungs: Clear bilateral breath sounds.   Cardiovascular   Auscultation of heart: Apical pulse normal, heart rate and rhythm normal, normal S1 and S2, no murmurs and no pericardial rub.    Exam for edema: No peripheral edema.   Abdomen   Abdominal Exam: No bruits, normal bowel sounds, soft, non-tender, no abdominal mass palpated.    Liver and Spleen exam: No hepato-splenomegaly.   Musculoskeletal   Examination of gait: Normal.    Inspection of digits and nails: No clubbing or cyanosis of the fingernails.    Inspection/palpation of joints, bones and muscles: No joint swelling. Normal movement of all extremities.   Skin   Skin inspection: Normal skin color and pigmentation, normal skin turgor and no visible rash.   Neurologic   Cranial nerves: Nerves 2-12 were intact, no focal neuro defects.    Assessment/Plan           Patient with a past medical history of HTN, HLD, COPD, Elevated CAC with a normal stress test (2022)Hyperglycemia/  Pre Diabetes, Schizoaffective disorder, DJD, Osteopenia (Oct 2026), Mammogram (Feb), Colonoscopy up to date      # HTN  Stable  Continue Metoprolol 50 mg/ Amlodipine             # Prediabetes  A1c of 6.4 in April   has gained weight  Continue daily exrcise  Walk 30 minutes daily  Re check A1c     # HLD  Stable  Continue atorvastatin  Watch salt, avoid excessive caffeine  Avoid processed meats/ sugars/juices Instead eat fresh fruit  Add walnuts and almonds to your diet  exercise 6 days a week for 30 minutes     # COPD  Stable on Wixela  Continue Albuterol PRN     # Urinary incontinence  Timed urination     # Schizoaffective disorder  In remission    # Left hip bursitis  Monitor  If gets worse will refer to Ortho    Patient on examination is in fair health except for medical conditions discussed above, obtained screening blood tests to screen for high cholesterol, diabetes, thyroid, Ekg if above 50 years old or earlier if with cardiac history. Patient should be taking enough calcium in a balanced diet Weight control especially if BMI is above ideal range. For Female Patients Only: Mammogram yearly and PAP test with gynecology unless s/p Total hysterectomy Bone density test at age 60 and above and every two years after that. Preventive Medicine: colon cancer screening by age 45 if no family history as well PSA @ 50 , balanced diet, and exercise as discussed. Seat belt use for injury prevention, living will. Substance use and /or tobacco use counseled when applicable. Alcohol use discussed, use designated . Immunizations TD age 50 and every 10 years. Pneumovax and shingles vaccine counseled. Yearly flu vaccine unless contraindicated. More than 50% of office visit time spent counseling the patient, questions were answered.   I have discussed with the patient the importance of vaccination for COVID-19, pneumonia , shingles and influenza. I explained that these vaccines can prevent serious illness and that they can  protect patients, their families, friends, and communities, and keep them healthy.    If problems arise, patient is to call or come back in. It is understood that the responsibility of healthcare is shared by the patient by following a healthy lifestyle and following the plan above as discussed. Advised complete physical examination every year. Pending additional results, may need to come for a return office visit for follow-up.   All questions and concerns were addressed.

## 2025-07-30 LAB
EST. AVERAGE GLUCOSE BLD GHB EST-MCNC: 134 MG/DL
EST. AVERAGE GLUCOSE BLD GHB EST-SCNC: 7.4 MMOL/L
HBA1C MFR BLD: 6.3 %

## 2025-08-13 DIAGNOSIS — M19.91 PRIMARY OSTEOARTHRITIS, UNSPECIFIED SITE: ICD-10-CM

## 2025-08-13 RX ORDER — CELECOXIB 200 MG/1
200 CAPSULE ORAL DAILY
Qty: 90 CAPSULE | Refills: 2 | Status: SHIPPED | OUTPATIENT
Start: 2025-08-13

## 2025-10-30 ENCOUNTER — APPOINTMENT (OUTPATIENT)
Dept: PRIMARY CARE | Facility: CLINIC | Age: 80
End: 2025-10-30
Payer: MEDICARE